# Patient Record
Sex: FEMALE | Race: WHITE | NOT HISPANIC OR LATINO | Employment: FULL TIME | URBAN - METROPOLITAN AREA
[De-identification: names, ages, dates, MRNs, and addresses within clinical notes are randomized per-mention and may not be internally consistent; named-entity substitution may affect disease eponyms.]

---

## 2019-05-23 ENCOUNTER — TRANSFERRED RECORDS (OUTPATIENT)
Dept: MULTI SPECIALTY CLINIC | Facility: CLINIC | Age: 39
End: 2019-05-23
Payer: COMMERCIAL

## 2019-05-23 LAB
ALT SERPL-CCNC: 15 U/L (ref 0–55)
AST SERPL-CCNC: 29 U/L (ref 0–35)
CHOLESTEROL (EXTERNAL): 183 MG/DL (ref 100–200)
CREATININE (EXTERNAL): 0.76 MG/DL (ref 0.6–1.1)
GFR ESTIMATED (EXTERNAL): 86 ML/MIN/1.73M2
GFR ESTIMATED (IF AFRICAN AMERICAN) (EXTERNAL): >90 ML/MIN/1.73M2
GLUCOSE (EXTERNAL): 71 MG/DL (ref 70–100)
HDLC SERPL-MCNC: 65 MG/DL (ref 40–80)
HPV ABSTRACT: NORMAL
LDL CHOLESTEROL (EXTERNAL): 103 MG/DL (ref 0–129)
PAP-ABSTRACT: NORMAL
POTASSIUM (EXTERNAL): 3.8 MEQ/L (ref 3.5–5.3)
TRIGLYCERIDES (EXTERNAL): 75 MG/DL (ref 50–120)
TSH SERPL-ACNC: 1.16 UIU/ML (ref 0.4–5)

## 2020-07-14 ENCOUNTER — VIRTUAL VISIT (OUTPATIENT)
Dept: FAMILY MEDICINE | Facility: CLINIC | Age: 40
End: 2020-07-14
Payer: COMMERCIAL

## 2020-07-14 DIAGNOSIS — F41.9 ANXIETY: ICD-10-CM

## 2020-07-14 DIAGNOSIS — N97.9 FEMALE FERTILITY PROBLEM: Primary | ICD-10-CM

## 2020-07-14 PROCEDURE — 99203 OFFICE O/P NEW LOW 30 MIN: CPT | Mod: 95 | Performed by: FAMILY MEDICINE

## 2020-07-14 RX ORDER — ALBUTEROL SULFATE 90 UG/1
2 AEROSOL, METERED RESPIRATORY (INHALATION) EVERY 6 HOURS
COMMUNITY
End: 2022-01-18

## 2020-07-14 RX ORDER — SUMATRIPTAN 50 MG/1
50 TABLET, FILM COATED ORAL
COMMUNITY
End: 2021-03-03
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
## 2020-08-17 ENCOUNTER — OFFICE VISIT (OUTPATIENT)
Dept: OBGYN | Facility: CLINIC | Age: 40
End: 2020-08-17
Attending: FAMILY MEDICINE
Payer: COMMERCIAL

## 2020-08-17 VITALS
DIASTOLIC BLOOD PRESSURE: 86 MMHG | HEIGHT: 61 IN | SYSTOLIC BLOOD PRESSURE: 127 MMHG | WEIGHT: 133.4 LBS | BODY MASS INDEX: 25.19 KG/M2 | HEART RATE: 75 BPM

## 2020-08-17 DIAGNOSIS — Z31.41 FERTILITY TESTING: Primary | ICD-10-CM

## 2020-08-17 PROCEDURE — G0463 HOSPITAL OUTPT CLINIC VISIT: HCPCS | Mod: ZF

## 2020-08-17 ASSESSMENT — MIFFLIN-ST. JEOR: SCORE: 1222.48

## 2020-08-17 NOTE — LETTER
"2020       RE: Kinsey Delgado  [address]  [address]  [place] [address]     Dear Colleague,    Thank you for referring your patient, Kinsey Delgado, to the [facility] at [facility]. Please see a copy of my visit note below.    39 yo  female with >6 mo history of actively trying to conceive.  She has one prior pregnancy which was uncomplicated. Used NFP in past 3 years w/o pregnancy, but has since tried for >6 months w/o conception.     Menses are regular, occurring every 28 days, lasting 5 days.  [place] has been timed for several months, w/o pain.  Having IC every other day based on OPKs which show ov day 13.     Same partner as prior pregnancy. Semen analysis pending.     History reviewed. No pertinent past medical history.    Past Surgical History:   Procedure Laterality Date     NO HISTORY OF SURGERY       GYN history. No h/o STI.  No h/o appendicitis. No know fibroids or other anomalies. Normal uls <12 mo ago.     Current Outpatient Medications:      albuterol (PROAIR HFA/PROVENTIL HFA/VENTOLIN HFA) 108 (90 Base) MCG/ACT inhaler, Inhale 2 puffs into the lungs every 6 hours, Disp: , Rfl:      SUMAtriptan (IMITREX) 50 MG tablet, Take 50 mg by mouth at onset of headache for migraine, Disp: , Rfl:      Prenatal Vit-Fe Fumarate-FA (PRENATAL VITAMINS PO), , Disp: , Rfl:     # 1 - Date: 17, Sex: Female, Weight: 3.118 kg (6 lb 14 oz), GA: 42w0d, Delivery: , Apgar1: None, Apgar5: None, Living: None, Birth Comments: None    Ht 1.549 m (5' 1\")   Wt 60.5 kg (133 lb 6.4 oz)   LMP 2020 (Exact Date)   Breastfeeding No   BMI 25.21 kg/m     Gen: AA and NAD  ISIDORO 7- 3.  Feels much less stress since moving her where grandparents are available to help w/ .   Non labored breathing  MSK w/o abnormalities    A/p: 39 yo  female here for fertility evaluation.    1.   Orders Placed This Encounter   Procedures     XR " Hysterosalpingogram     Anti-Mullerian hormone     Prolactin     Progesterone     TSH with free T4 reflex     2. [facility] clinic information given to pt as welll     3. F/u semenanalyisis when available    Yulia De Souza MD, FACOG  [facility]  OB/GYN    8/17/2020  4:01 PM

## 2020-08-17 NOTE — PROGRESS NOTES
"39 yo  female with >6 mo history of actively trying to conceive.  She has one prior pregnancy which was uncomplicated. Used NFP in past 3 years w/o pregnancy, but has since tried for >6 months w/o conception.     Menses are regular, occurring every 28 days, lasting 5 days.  [place] has been timed for several months, w/o pain.  Having IC every other day based on OPKs which show ov day 13.     Same partner as prior pregnancy. Semen analysis pending.     History reviewed. No pertinent past medical history.    Past Surgical History:   Procedure Laterality Date     NO HISTORY OF SURGERY       GYN history. No h/o STI.  No h/o appendicitis. No know fibroids or other anomalies. Normal uls <12 mo ago.     Current Outpatient Medications:      albuterol (PROAIR HFA/PROVENTIL HFA/VENTOLIN HFA) 108 (90 Base) MCG/ACT inhaler, Inhale 2 puffs into the lungs every 6 hours, Disp: , Rfl:      SUMAtriptan (IMITREX) 50 MG tablet, Take 50 mg by mouth at onset of headache for migraine, Disp: , Rfl:      Prenatal Vit-Fe Fumarate-FA (PRENATAL VITAMINS PO), , Disp: , Rfl:     # 1 - Date: 17, Sex: Female, Weight: 3.118 kg (6 lb 14 oz), GA: 42w0d, Delivery: , Apgar1: None, Apgar5: None, Living: None, Birth Comments: None    Ht 1.549 m (5' 1\")   Wt 60.5 kg (133 lb 6.4 oz)   LMP 2020 (Exact Date)   Breastfeeding No   BMI 25.21 kg/m     Gen: AA and NAD  ISIDORO 7- 3.  Feels much less stress since moving her where grandparents are available to help w/ .   Non labored breathing  MSK w/o abnormalities    A/p: 39 yo  female here for fertility evaluation.    1.   Orders Placed This Encounter   Procedures     XR Hysterosalpingogram     Anti-Mullerian hormone     Prolactin     Progesterone     TSH with free T4 reflex     2. [facility] information given to pt as welll     3. F/u semenanalyisis when available    Yulia De Souza MD, FACOG  [facility]  OB/GYN    2020  4:01 PM          "

## 2020-08-17 NOTE — NURSING NOTE
Chief Complaint   Patient presents with     Physical     Annual/discuss infertility       See COREY Thomas 8/17/2020

## 2020-08-24 DIAGNOSIS — Z31.41 FERTILITY TESTING: ICD-10-CM

## 2020-08-24 LAB
PROGEST SERPL-MCNC: 8.5 NG/ML
PROLACTIN SERPL-MCNC: 8 UG/L (ref 3–27)
TSH SERPL DL<=0.005 MIU/L-ACNC: 1.11 MU/L (ref 0.4–4)

## 2020-08-24 PROCEDURE — 84443 ASSAY THYROID STIM HORMONE: CPT | Performed by: FAMILY MEDICINE

## 2020-08-24 PROCEDURE — 84144 ASSAY OF PROGESTERONE: CPT | Performed by: FAMILY MEDICINE

## 2020-08-24 PROCEDURE — 99000 SPECIMEN HANDLING OFFICE-LAB: CPT | Performed by: FAMILY MEDICINE

## 2020-08-24 PROCEDURE — 36415 COLL VENOUS BLD VENIPUNCTURE: CPT | Performed by: FAMILY MEDICINE

## 2020-08-24 PROCEDURE — 84146 ASSAY OF PROLACTIN: CPT | Performed by: FAMILY MEDICINE

## 2020-08-24 PROCEDURE — 83520 IMMUNOASSAY QUANT NOS NONAB: CPT | Mod: 90 | Performed by: FAMILY MEDICINE

## 2020-08-26 LAB — MIS SERPL-MCNC: 2.17 NG/ML (ref 0.18–11.71)

## 2020-11-29 ENCOUNTER — HEALTH MAINTENANCE LETTER (OUTPATIENT)
Age: 40
End: 2020-11-29

## 2021-02-23 ENCOUNTER — MYC MEDICAL ADVICE (OUTPATIENT)
Dept: FAMILY MEDICINE | Facility: CLINIC | Age: 41
End: 2021-02-23

## 2021-02-23 NOTE — TELEPHONE ENCOUNTER
AS,  Please see below Alle[facility]ia message and advise.  Do you want virtual visit?  Thanks,  Marianela ORR RN

## 2021-03-03 ENCOUNTER — VIRTUAL VISIT (OUTPATIENT)
Dept: FAMILY MEDICINE | Facility: CLINIC | Age: 41
End: 2021-03-03
Payer: COMMERCIAL

## 2021-03-03 DIAGNOSIS — G43.709 CHRONIC MIGRAINE WITHOUT AURA WITHOUT STATUS MIGRAINOSUS, NOT INTRACTABLE: Primary | ICD-10-CM

## 2021-03-03 PROCEDURE — 99214 OFFICE O/P EST MOD 30 MIN: CPT | Mod: 95 | Performed by: FAMILY MEDICINE

## 2021-03-03 RX ORDER — BUTALBITAL, ACETAMINOPHEN AND CAFFEINE 50; 325; 40 MG/1; MG/1; MG/1
1 TABLET ORAL 2 TIMES DAILY PRN
Qty: 30 TABLET | Refills: 1 | Status: SHIPPED | OUTPATIENT
Start: 2021-03-03 | End: 2021-03-27

## 2021-03-03 RX ORDER — BUTALBITAL/ACETAMINOPHEN 50MG-325MG
1 TABLET ORAL
Qty: 30 TABLET | Refills: 0 | Status: SHIPPED | OUTPATIENT
Start: 2021-03-03 | End: 2021-06-10

## 2021-03-03 NOTE — PROGRESS NOTES
Kinsey is a 40 year old who is being evaluated via a billable video visit.      How would you like to obtain your AVS? MyChart  If the video visit is dropped, the invitation should be resent by:   Will anyone else be joining your video visit? No      Video Start Time: 10:45 AM    Assessment & Plan     Chronic migraine without aura without status migrainosus, not intractable  Assessment.  40-year old , undergoing treatment starting next week.  History of migraine headache  without aura.  Previously Imitrex has helped.  IVF clinic lysed her to avoid using sumatriptan.  We discussed other choices such butalbital pregnancy in detail.    Prevention with adequate hydration and tapering feeling gradually.  - Butalbital-Acetaminophen (PHRENILIN)  MG TABS per tablet  Dispense: 30 tablet; Refill: 0      30 minutes spent on the date of the encounter doing chart review, history and exam, documentation and further activities as noted above      MD KAVON Bains [facility]    Subjective   Kinsey is a 40 year old who presents for the following health issues  HPI   40-year-old female going [facility] is advised  not to use imitrex during IVF periods.  She reports longstanding history of migraine headaches Imitrex always take care of acute headache.  She reports her typical migraine headaches start with the neck pain, wraps around the right side of  Head  becomes throbbing, sensitive to light noise.  It is associated with nausea as well but without aura.  She has had migraine headaches since early 20's .  She states her migraine triggers are change of sleep pattern and around mensturatuon, lack of caffeine.. She is trying to taper caffeine down.    She often gets menstrual migraine a month.  [facility] provider approved butalbital with Tylenol but without caffeine if she has migraine headache during the 10-day stimulation cycle.    She has plans to start IVF week.    Medication Followup of  Sumatriptan    Taking Medication as prescribed: yes    Side Effects:  None    Medication Helping Symptoms:  yes         Review of Systems   Constitutional, HEENT, cardiovascular, pulmonary, GI, , musculoskeletal, neuro, skin, endocrine and psych systems are negative, except as otherwise noted.      Objective           Vitals:  No vitals were obtained today due to virtual visit.    Physical Exam   GENERAL: Healthy, alert and no distress  EYES: Eyes grossly normal to inspection.  No discharge or erythema, or obvious scleral/conjunctival abnormalities.  RESP: No audible wheeze, cough, or visible cyanosis.  No visible retractions or increased work of breathing.    SKIN: Visible skin clear. No significant rash, abnormal pigmentation or lesions.  NEURO: Cranial nerves grossly intact.  Mentation and speech appropriate for age.  PSYCH: Mentation appears normal, affect normal/bright, judgement and insight intact, normal speech and appearance well-groomed.                Video-Visit Details    Type of service:  Video Visit    Video End Time:11:01 AM    Originating Location (pt. Location): Home    Distant Location (provider location):  [facility]     Platform used for Video Visit: Spinl[employer]

## 2021-03-03 NOTE — NURSING NOTE
"Chief Complaint   Patient presents with     Recheck Medication     Headache     initial There were no vitals taken for this visit. Estimated body mass index is 25.21 kg/m  as calculated from the following:    Height as of 8/17/20: 1.549 m (5' 1\").    Weight as of 8/17/20: 60.5 kg (133 lb 6.4 oz).  BP completed using cuff size:   L  R arm      Health Maintenance that is potentially due pending provider review:      Andrew Saini ma  "

## 2021-05-31 LAB — TSH SERPL-ACNC: 1.37 UIU/ML (ref 0.27–4200)

## 2021-08-31 ENCOUNTER — VIRTUAL VISIT (OUTPATIENT)
Dept: FAMILY MEDICINE | Facility: CLINIC | Age: 41
End: 2021-08-31
Payer: COMMERCIAL

## 2021-08-31 DIAGNOSIS — N97.9 FEMALE FERTILITY PROBLEM: Primary | ICD-10-CM

## 2021-08-31 DIAGNOSIS — G43.709 CHRONIC MIGRAINE WITHOUT AURA WITHOUT STATUS MIGRAINOSUS, NOT INTRACTABLE: ICD-10-CM

## 2021-08-31 PROCEDURE — 99213 OFFICE O/P EST LOW 20 MIN: CPT | Mod: 95 | Performed by: FAMILY MEDICINE

## 2021-08-31 RX ORDER — BUTALBITAL/ACETAMINOPHEN 50MG-325MG
1 TABLET ORAL
Qty: 30 TABLET | Refills: 0 | Status: ON HOLD | OUTPATIENT
Start: 2021-08-31 | End: 2022-02-04

## 2021-08-31 NOTE — PROGRESS NOTES
Kinsey is a 41 year old who is being evaluated via a billable video visit.      How would you like to obtain your AVS? MyChart  If the video visit is dropped, the invitation should be resent by: Text to cell phone: [phone]  Will anyone else be joining your video visit? No      Video Start Time: 5:52 PM    Assessment & Plan     Chronic migraine without aura without status migrainosus, not intractable  Comments: 41 year old female with known history  menstrual migraine headache / unable to take preventative medications because going thorugh 2nd cycle of IVF by  Dr Antunez [facility]-([phone]). They have given her clear guidelines not to be on any medications beside one prescribed for phrenilin.  - Butalbital-Acetaminophen (PHRENILIN)  MG TABS per tablet; Take 1 tablet by mouth once as needed for migraine or moderate to severe pain (at onset and repeat in 8 hours prn)    Potential medication side effects were discussed with the patient; let me know if any occur.    27 minutes spent on the date of the encounter doing chart review, history and exam, documentation and further activities per the note      Return in about 3 months (around 11/30/2021) for concerns,unresolved, medications recheck/review/refill.    Chelsey Morales MD  [facility]    Subjective   Kinsey is a 41 year old who presents for the following health issues   HPI     Migraine     Since your last clinic visit, how have your headaches changed?  SAME     How often are you getting headaches or migraines? Monthly      Are you able to do normal daily activities when you have a migraine? With medication migraine is manageable without medication she is out for 3 days     Are you taking rescue/relief medications? (Select all that apply) Other: Phrenilin    How helpful is your rescue/relief medication?  I get some relief    Are you taking any medications to prevent migraines? (Select all that apply)  No    In the past 4 weeks, how  often have you gone to urgent care or the emergency room because of your headaches?  0      How many servings of fruits and vegetables do you eat daily?  Normal amount     On average, how many sweetened beverages do you drink each day (Examples: soda, juice, sweet tea, etc.  Do NOT count diet or artificially sweetened beverages)?   0    How many days per week do you exercise enough to make your heart beat faster? 7    How many minutes a day do you exercise enough to make your heart beat faster? 30 - 60    How many days per week do you miss taking your medication? 0    Each menstrual  migraine episode- needs at least 6-8 tabs   Currently on estrogen pills by IVF - 2nd cycle.  1st cycle - ended in spontaneous loss of pregnancy.   Almost can predict when she would have headache           Review of Systems   Constitutional, HEENT, cardiovascular, pulmonary, GI, , musculoskeletal, neuro, skin, endocrine and psych systems are negative, except as otherwise noted.      Objective           Vitals:  No vitals were obtained today due to virtual visit.    Physical Exam   GENERAL: Healthy, alert and no distress  EYES: Eyes grossly normal to inspection.  No discharge or erythema, or obvious scleral/conjunctival abnormalities.  RESP: No audible wheeze, cough, or visible cyanosis.  No visible retractions or increased work of breathing.    SKIN: Visible skin clear. No significant rash, abnormal pigmentation or lesions.  NEURO: Cranial nerves grossly intact.  Mentation and speech appropriate for age.  PSYCH: Mentation appears normal, affect normal/bright, judgement and insight intact, normal speech and appearance well-groomed.          Video-Visit Details    Type of service:  Video Visit    Video End Time:6:02 PM    Originating Location (pt. Location): Home    Distant Location (provider location):  [facility]     Platform used for Video Visit: [employer] Ozone

## 2021-09-08 ENCOUNTER — TRANSFERRED RECORDS (OUTPATIENT)
Dept: HEALTH INFORMATION MANAGEMENT | Facility: CLINIC | Age: 41
End: 2021-09-08
Payer: COMMERCIAL

## 2021-09-08 LAB
C TRACH DNA SPEC QL PROBE+SIG AMP: NEGATIVE
HIV 1&2 EXT: NORMAL
N GONORRHOEA DNA SPEC QL PROBE+SIG AMP: NEGATIVE
SPECIMEN DESCRIP: NORMAL
SPECIMEN DESCRIPTION: NORMAL

## 2021-09-14 ENCOUNTER — TRANSFERRED RECORDS (OUTPATIENT)
Dept: HEALTH INFORMATION MANAGEMENT | Facility: CLINIC | Age: 41
End: 2021-09-14
Payer: COMMERCIAL

## 2021-09-16 ENCOUNTER — TRANSFERRED RECORDS (OUTPATIENT)
Dept: HEALTH INFORMATION MANAGEMENT | Facility: CLINIC | Age: 41
End: 2021-09-16
Payer: COMMERCIAL

## 2021-09-17 ENCOUNTER — TRANSFERRED RECORDS (OUTPATIENT)
Dept: HEALTH INFORMATION MANAGEMENT | Facility: CLINIC | Age: 41
End: 2021-09-17
Payer: COMMERCIAL

## 2021-09-18 ENCOUNTER — TRANSFERRED RECORDS (OUTPATIENT)
Dept: HEALTH INFORMATION MANAGEMENT | Facility: CLINIC | Age: 41
End: 2021-09-18
Payer: COMMERCIAL

## 2021-09-25 ENCOUNTER — HEALTH MAINTENANCE LETTER (OUTPATIENT)
Age: 41
End: 2021-09-25

## 2021-12-28 ENCOUNTER — TRANSFERRED RECORDS (OUTPATIENT)
Dept: HEALTH INFORMATION MANAGEMENT | Facility: CLINIC | Age: 41
End: 2021-12-28
Payer: COMMERCIAL

## 2022-01-15 ENCOUNTER — HEALTH MAINTENANCE LETTER (OUTPATIENT)
Age: 42
End: 2022-01-15

## 2022-01-17 ASSESSMENT — ANXIETY QUESTIONNAIRES
5. BEING SO RESTLESS THAT IT IS HARD TO SIT STILL: NOT AT ALL
2. NOT BEING ABLE TO STOP OR CONTROL WORRYING: NOT AT ALL
7. FEELING AFRAID AS IF SOMETHING AWFUL MIGHT HAPPEN: NOT AT ALL
7. FEELING AFRAID AS IF SOMETHING AWFUL MIGHT HAPPEN: NOT AT ALL
3. WORRYING TOO MUCH ABOUT DIFFERENT THINGS: NOT AT ALL
GAD7 TOTAL SCORE: 1
1. FEELING NERVOUS, ANXIOUS, OR ON EDGE: NOT AT ALL
6. BECOMING EASILY ANNOYED OR IRRITABLE: SEVERAL DAYS
GAD7 TOTAL SCORE: 1
4. TROUBLE RELAXING: NOT AT ALL

## 2022-01-18 ENCOUNTER — ANCILLARY PROCEDURE (OUTPATIENT)
Dept: ULTRASOUND IMAGING | Facility: CLINIC | Age: 42
End: 2022-01-18
Attending: MIDWIFE
Payer: COMMERCIAL

## 2022-01-18 ENCOUNTER — APPOINTMENT (OUTPATIENT)
Dept: LAB | Facility: CLINIC | Age: 42
End: 2022-01-18
Attending: MIDWIFE
Payer: COMMERCIAL

## 2022-01-18 ENCOUNTER — OFFICE VISIT (OUTPATIENT)
Dept: OBGYN | Facility: CLINIC | Age: 42
End: 2022-01-18
Attending: MIDWIFE
Payer: COMMERCIAL

## 2022-01-18 VITALS
BODY MASS INDEX: 25.02 KG/M2 | HEIGHT: 61 IN | WEIGHT: 132.5 LBS | HEART RATE: 67 BPM | DIASTOLIC BLOOD PRESSURE: 78 MMHG | SYSTOLIC BLOOD PRESSURE: 111 MMHG

## 2022-01-18 DIAGNOSIS — Z32.01 PREGNANCY TEST POSITIVE: ICD-10-CM

## 2022-01-18 DIAGNOSIS — O09.529 SUPERVISION OF HIGH-RISK PREGNANCY OF ELDERLY MULTIGRAVIDA: Primary | ICD-10-CM

## 2022-01-18 DIAGNOSIS — O44.01 PLACENTA PREVIA ANTEPARTUM IN FIRST TRIMESTER: ICD-10-CM

## 2022-01-18 DIAGNOSIS — Z31.41 FERTILITY TESTING: Primary | ICD-10-CM

## 2022-01-18 DIAGNOSIS — O09.811 PREGNANCY RESULTING FROM IN VITRO FERTILIZATION IN FIRST TRIMESTER: ICD-10-CM

## 2022-01-18 PROBLEM — N97.9 FEMALE FERTILITY PROBLEM: Status: RESOLVED | Noted: 2020-07-14 | Resolved: 2022-01-18

## 2022-01-18 PROBLEM — G43.709 CHRONIC MIGRAINE WITHOUT AURA WITHOUT STATUS MIGRAINOSUS, NOT INTRACTABLE: Status: RESOLVED | Noted: 2021-08-31 | Resolved: 2022-01-18

## 2022-01-18 LAB
ABO/RH(D): NORMAL
ANTIBODY SCREEN: NEGATIVE
BASOPHILS # BLD AUTO: 0 10E3/UL (ref 0–0.2)
BASOPHILS NFR BLD AUTO: 0 %
DEPRECATED CALCIDIOL+CALCIFEROL SERPL-MC: 50 UG/L (ref 20–75)
EOSINOPHIL # BLD AUTO: 0.1 10E3/UL (ref 0–0.7)
EOSINOPHIL NFR BLD AUTO: 1 %
ERYTHROCYTE [DISTWIDTH] IN BLOOD BY AUTOMATED COUNT: 12.2 % (ref 10–15)
HBV SURFACE AB SERPL IA-ACNC: 59.27 M[IU]/ML
HBV SURFACE AG SERPL QL IA: NONREACTIVE
HCT VFR BLD AUTO: 39.5 % (ref 35–47)
HCV AB SERPL QL IA: NONREACTIVE
HGB BLD-MCNC: 13 G/DL (ref 11.7–15.7)
HIV 1+2 AB+HIV1 P24 AG SERPL QL IA: NONREACTIVE
IMM GRANULOCYTES # BLD: 0 10E3/UL
IMM GRANULOCYTES NFR BLD: 0 %
LYMPHOCYTES # BLD AUTO: 2 10E3/UL (ref 0.8–5.3)
LYMPHOCYTES NFR BLD AUTO: 25 %
MCH RBC QN AUTO: 30.3 PG (ref 26.5–33)
MCHC RBC AUTO-ENTMCNC: 32.9 G/DL (ref 31.5–36.5)
MCV RBC AUTO: 92 FL (ref 78–100)
MONOCYTES # BLD AUTO: 0.6 10E3/UL (ref 0–1.3)
MONOCYTES NFR BLD AUTO: 7 %
NEUTROPHILS # BLD AUTO: 5.3 10E3/UL (ref 1.6–8.3)
NEUTROPHILS NFR BLD AUTO: 67 %
NRBC # BLD AUTO: 0 10E3/UL
NRBC BLD AUTO-RTO: 0 /100
PLATELET # BLD AUTO: 319 10E3/UL (ref 150–450)
RBC # BLD AUTO: 4.29 10E6/UL (ref 3.8–5.2)
SPECIMEN EXPIRATION DATE: NORMAL
T PALLIDUM AB SER QL: NONREACTIVE
WBC # BLD AUTO: 8 10E3/UL (ref 4–11)

## 2022-01-18 PROCEDURE — 87389 HIV-1 AG W/HIV-1&-2 AB AG IA: CPT | Performed by: MIDWIFE

## 2022-01-18 PROCEDURE — 76801 OB US < 14 WKS SINGLE FETUS: CPT

## 2022-01-18 PROCEDURE — 86780 TREPONEMA PALLIDUM: CPT | Performed by: MIDWIFE

## 2022-01-18 PROCEDURE — 36415 COLL VENOUS BLD VENIPUNCTURE: CPT | Performed by: MIDWIFE

## 2022-01-18 PROCEDURE — 76801 OB US < 14 WKS SINGLE FETUS: CPT | Mod: 26 | Performed by: OBSTETRICS & GYNECOLOGY

## 2022-01-18 PROCEDURE — 86762 RUBELLA ANTIBODY: CPT | Performed by: MIDWIFE

## 2022-01-18 PROCEDURE — G0463 HOSPITAL OUTPT CLINIC VISIT: HCPCS | Mod: 25

## 2022-01-18 PROCEDURE — 86803 HEPATITIS C AB TEST: CPT | Performed by: MIDWIFE

## 2022-01-18 PROCEDURE — 86901 BLOOD TYPING SEROLOGIC RH(D): CPT | Performed by: MIDWIFE

## 2022-01-18 PROCEDURE — 87340 HEPATITIS B SURFACE AG IA: CPT | Performed by: MIDWIFE

## 2022-01-18 PROCEDURE — 99207 PR PRENATAL VISIT: CPT | Performed by: MIDWIFE

## 2022-01-18 PROCEDURE — 85049 AUTOMATED PLATELET COUNT: CPT | Performed by: MIDWIFE

## 2022-01-18 PROCEDURE — 82306 VITAMIN D 25 HYDROXY: CPT | Performed by: MIDWIFE

## 2022-01-18 PROCEDURE — 86706 HEP B SURFACE ANTIBODY: CPT | Performed by: MIDWIFE

## 2022-01-18 RX ORDER — MULTIVITAMIN WITH IRON
1 TABLET ORAL DAILY
COMMUNITY
End: 2023-06-22

## 2022-01-18 RX ORDER — ASPIRIN 81 MG/1
81 TABLET, CHEWABLE ORAL DAILY
Status: ON HOLD | COMMUNITY
End: 2022-02-04

## 2022-01-18 ASSESSMENT — MIFFLIN-ST. JEOR: SCORE: 1213.4

## 2022-01-18 ASSESSMENT — ANXIETY QUESTIONNAIRES: GAD7 TOTAL SCORE: 1

## 2022-01-18 NOTE — H&P (VIEW-ONLY)
[facility] OB Intake note  Subjective   41 year old female presents to clinic for initiation of OB care. No LMP recorded. Patient is pregnant.  at 10w5d by Estimated Date of Delivery: Aug 11, 2022 based on US confirms. Reviewed dating ultrasound. Pregnancy is planned (IVF - [facility]). Has one dose of progesterone left.    Does have migraines - last was over Xmas. Usually associated with beginning of menstrual cycle. Last 3 days. Did not have them before when she was on birth control, nor when she was pregnant the first time. [facility] gave OK for migraine medicine butalbital-acetaminophen. Not taking Imitrex.    No bleeding no cramping, no nausea. Feels very hungry, tired, emotional. Not needing any anti-nausea medications.    Partner name - Justus.        - Genetic/Infection questionnaire completed, risks include none. Pt  does not have a recent known exposure to Parvo or CMV so IgG/IgM testing WILL NOT be ordered.   Recommended Flu Vaccine.  Patient already received Flu Vaccine  - Current Medications  Current Outpatient Medications   Medication Sig Dispense Refill     aspirin (ASA) 81 MG chewable tablet Take 81 mg by mouth daily       Butalbital-Acetaminophen (PHRENILIN)  MG TABS per tablet Take 1 tablet by mouth once as needed for migraine or moderate to severe pain (at onset and repeat in 8 hours prn) 30 tablet 0     Docosahexaenoic Acid (DHA) 200 MG capsule Take 200 mg by mouth daily       magnesium 250 MG tablet Take 1 tablet by mouth daily       Prenatal Vit-Fe Fumarate-FA (PRENATAL VITAMINS PO)            - Co-morbids   Past Medical History:   Diagnosis Date     Female infertility      - Risk for GDM : No known risk factors for GDM so  WILL NOT have an early GCT and Hgb A1C    - High risk factors for Pre E-  No known risk factors of High risk for Pre E       - Moderate risk factor for Pre E Age =35 years or older   Meets one high risk factors or one of the moderate risk facrtors  so WILL consider starting low  dose aspirin (81mg) starting between 12 and 28 weeks to prevent early onset preeclampsia.  ON ASPIRIN    - The patient  does not have a history of spontaneous  birth so  WILL NOT consider progesterone starting at 16-20 weeks and/or serial transvaginal cervical length ultrasounds from 16-24 weeks.     -The patient does not have a history of immunosuppresion or HIV so Toxoplasma IgG/IgM WILL NOT be ordered.    -Assess risk for asymptomatic latent TB (prior infection, recent immigrant from epidemic areas, immunosuppression, living in overcrowded environment):   WILL NOT have PPD skin test or Quantiferon-TB Gold Plus blood draw. *both options valid.    PERSONAL/SOCIAL HISTORY    lives with their family - daughter is 5. Have a cat and dog - both older.    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  Her partner works as a [employer] illustrator.   History of anxiety or depression - hx of both, not since college. Was on selective serotonin reuptake inhibitor before last pregnancy, has not taken since. In therapy. - if Yes, therapy/medication/hospitalization - no.   Additional items: None    Objective  -VS: reviewed and within normal limits   -General appearance: no acute distress, patient is comfortable   NEUROLOGICAL/PSYCHIATRIC   - Orientated x3,   -Mood and affect: normal     Assessment/Plan  Kinsey was seen today for prenatal care.    Diagnoses and all orders for this visit:    Supervision of high-risk pregnancy of elderly multigravida  -     25- OH-Vitamin D  -     ABO/Rh Type and Screen  -     CBC with Platelets Differential  -     Hepatitis B Surface Antigen  -     Hepatitis C antibody  -     Hepatitis B Surface Antibody  -     HIV Antigen Antibody Combo  -     Rubella Antibody IgG  -     Treponema Abs w Reflex to RPR and Titer  -     Urine Culture Aerobic Bacterial  -     [facility] Fetal [facility]tr Referral - Pregnancy; Future    Pregnancy resulting from in vitro  fertilization in first trimester  -     25- OH-Vitamin D  -     ABO/Rh Type and Screen  -     CBC with Platelets Differential  -     Hepatitis B Surface Antigen  -     Hepatitis C antibody  -     Hepatitis B Surface Antibody  -     HIV Antigen Antibody Combo  -     Rubella Antibody IgG  -     Treponema Abs w Reflex to RPR and Titer  -     Urine Culture Aerobic Bacterial  -     Mat Fetal Med Ctr Referral - Pregnancy; Future    Placenta previa antepartum in first trimester  Ultrasound notes placenta previa. Discussed with patient. Counseled on safe practices including no vaginal intercourse, watch for vaginal bleeding.      41 year old  10w5d weeks of pregnancy with AC of Aug 11, 2022 by LMP of No LMP recorded. Patient is pregnant.. Ultrasound confirms.   Outpatient Encounter Medications as of 2022   Medication Sig Dispense Refill     aspirin (ASA) 81 MG chewable tablet Take 81 mg by mouth daily       Butalbital-Acetaminophen (PHRENILIN)  MG TABS per tablet Take 1 tablet by mouth once as needed for migraine or moderate to severe pain (at onset and repeat in 8 hours prn) 30 tablet 0     Docosahexaenoic Acid (DHA) 200 MG capsule Take 200 mg by mouth daily       magnesium 250 MG tablet Take 1 tablet by mouth daily       Prenatal Vit-Fe Fumarate-FA (PRENATAL VITAMINS PO)        [DISCONTINUED] albuterol (PROAIR HFA/PROVENTIL HFA/VENTOLIN HFA) 108 (90 Base) MCG/ACT inhaler Inhale 2 puffs into the lungs every 6 hours (Patient not taking: Reported on 2022)       No facility-administered encounter medications on file as of 2022.      Orders Placed This Encounter   Procedures     25- OH-Vitamin D     Hepatitis B Surface Antigen     Hepatitis C antibody     Hepatitis B Surface Antibody     HIV Antigen Antibody Combo     Rubella Antibody IgG     Treponema Abs w Reflex to RPR and Titer     CBC with platelets and differential     Mat Fetal Med Ctr Referral - Pregnancy     Adult Type and Screen                  Orders Placed This Encounter   Procedures     25- OH-Vitamin D     Hepatitis B Surface Antigen     Hepatitis C antibody     Hepatitis B Surface Antibody     HIV Antigen Antibody Combo     Rubella Antibody IgG     Treponema Abs w Reflex to RPR and Titer     CBC with platelets and differential     Mat Fetal Med Ctr Referral - Pregnancy     Adult Type and Screen     ABO/Rh Type and Screen     CBC with Platelets Differential     - Oriented to Practice, types of care, and how to reach a provider.  Pt prefers Undecided between CNM and MD, will continue to consider.   - Patient received 1st trimester new OB education packet complete with aide of [employer] booklet including information on genetic screening test options.  - Patient declines all genetic screening (had pre-implantation screening).  - Educational handout on the prevention of infections diseases during pregnancy provided.  - Patient was encouraged to start prenatal vitamins as tolerated.    - Patient was sent to lab for routine OB labs including see above).     - Reviewed risk for diabetes in pregnancy - not high risk  - Reviewed recommendation for low dose aspirin daily to prevent pre eclampsia, pt agrees, follow up at NOB visit - already on baby aspirin  - Pregnancy concerns to be addressed by provider at new OB exam include: due for Pap.    Pt to RTO for NOB visit in 4 weeks and prn if questions or concerns.    I, Vivienne Campbell MD, am serving as a scribe; to document services personally performed by  CNM based on data collection and the provider's statements to me.     Vivienne Campbell MD  PGY-1, [facility]      The encounter was performed by me and scribed by the PGY-1 MD . The scribed note accurately reflects my personal services and decisions made by me.     Shannan Beckwith, CHRISTEN CNM      Answers for HPI/ROS submitted by the patient on 1/17/2022  ISIDORO 7 TOTAL SCORE: 1

## 2022-01-18 NOTE — PROGRESS NOTES
[facility] OB Intake note  Subjective   41 year old female presents to clinic for initiation of OB care. No LMP recorded. Patient is pregnant.  at 10w5d by Estimated Date of Delivery: Aug 11, 2022 based on US confirms. Reviewed dating ultrasound. Pregnancy is planned (IVF - [facility]). Has one dose of progesterone left.    Does have migraines - last was over Xmas. Usually associated with beginning of menstrual cycle. Last 3 days. Did not have them before when she was on birth control, nor when she was pregnant the first time. [facility] gave OK for migraine medicine butalbital-acetaminophen. Not taking Imitrex.    No bleeding no cramping, no nausea. Feels very hungry, tired, emotional. Not needing any anti-nausea medications.    Partner name - Justus.        - Genetic/Infection questionnaire completed, risks include none. Pt  does not have a recent known exposure to Parvo or CMV so IgG/IgM testing WILL NOT be ordered.   Recommended Flu Vaccine.  Patient already received Flu Vaccine  - Current Medications  Current Outpatient Medications   Medication Sig Dispense Refill     aspirin (ASA) 81 MG chewable tablet Take 81 mg by mouth daily       Butalbital-Acetaminophen (PHRENILIN)  MG TABS per tablet Take 1 tablet by mouth once as needed for migraine or moderate to severe pain (at onset and repeat in 8 hours prn) 30 tablet 0     Docosahexaenoic Acid (DHA) 200 MG capsule Take 200 mg by mouth daily       magnesium 250 MG tablet Take 1 tablet by mouth daily       Prenatal Vit-Fe Fumarate-FA (PRENATAL VITAMINS PO)            - Co-morbids   Past Medical History:   Diagnosis Date     Female infertility      - Risk for GDM : No known risk factors for GDM so  WILL NOT have an early GCT and Hgb A1C    - High risk factors for Pre E-  No known risk factors of High risk for Pre E       - Moderate risk factor for Pre E Age =35 years or older   Meets one high risk factors or one of the moderate risk facrtors  so WILL consider starting low  dose aspirin (81mg) starting between 12 and 28 weeks to prevent early onset preeclampsia.  ON ASPIRIN    - The patient  does not have a history of spontaneous  birth so  WILL NOT consider progesterone starting at 16-20 weeks and/or serial transvaginal cervical length ultrasounds from 16-24 weeks.     -The patient does not have a history of immunosuppresion or HIV so Toxoplasma IgG/IgM WILL NOT be ordered.    -Assess risk for asymptomatic latent TB (prior infection, recent immigrant from epidemic areas, immunosuppression, living in overcrowded environment):   WILL NOT have PPD skin test or Quantiferon-TB Gold Plus blood draw. *both options valid.    PERSONAL/SOCIAL HISTORY    lives with their family - daughter is 5. Have a cat and dog - both older.    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  Her partner works as a [employer] illustrator.   History of anxiety or depression - hx of both, not since college. Was on selective serotonin reuptake inhibitor before last pregnancy, has not taken since. In therapy. - if Yes, therapy/medication/hospitalization - no.   Additional items: None    Objective  -VS: reviewed and within normal limits   -General appearance: no acute distress, patient is comfortable   NEUROLOGICAL/PSYCHIATRIC   - Orientated x3,   -Mood and affect: normal     Assessment/Plan  Kinsey was seen today for prenatal care.    Diagnoses and all orders for this visit:    Supervision of high-risk pregnancy of elderly multigravida  -     25- OH-Vitamin D  -     ABO/Rh Type and Screen  -     CBC with Platelets Differential  -     Hepatitis B Surface Antigen  -     Hepatitis C antibody  -     Hepatitis B Surface Antibody  -     HIV Antigen Antibody Combo  -     Rubella Antibody IgG  -     Treponema Abs w Reflex to RPR and Titer  -     Urine Culture Aerobic Bacterial  -     Mat Fetal Med Ctr Referral - Pregnancy; Future    Pregnancy resulting from in vitro  fertilization in first trimester  -     25- OH-Vitamin D  -     ABO/Rh Type and Screen  -     CBC with Platelets Differential  -     Hepatitis B Surface Antigen  -     Hepatitis C antibody  -     Hepatitis B Surface Antibody  -     HIV Antigen Antibody Combo  -     Rubella Antibody IgG  -     Treponema Abs w Reflex to RPR and Titer  -     Urine Culture Aerobic Bacterial  -     Mat Fetal Med Ctr Referral - Pregnancy; Future    Placenta previa antepartum in first trimester  Ultrasound notes placenta previa. Discussed with patient. Counseled on safe practices including no vaginal intercourse, watch for vaginal bleeding.      41 year old  10w5d weeks of pregnancy with AC of Aug 11, 2022 by LMP of No LMP recorded. Patient is pregnant.. Ultrasound confirms.   Outpatient Encounter Medications as of 2022   Medication Sig Dispense Refill     aspirin (ASA) 81 MG chewable tablet Take 81 mg by mouth daily       Butalbital-Acetaminophen (PHRENILIN)  MG TABS per tablet Take 1 tablet by mouth once as needed for migraine or moderate to severe pain (at onset and repeat in 8 hours prn) 30 tablet 0     Docosahexaenoic Acid (DHA) 200 MG capsule Take 200 mg by mouth daily       magnesium 250 MG tablet Take 1 tablet by mouth daily       Prenatal Vit-Fe Fumarate-FA (PRENATAL VITAMINS PO)        [DISCONTINUED] albuterol (PROAIR HFA/PROVENTIL HFA/VENTOLIN HFA) 108 (90 Base) MCG/ACT inhaler Inhale 2 puffs into the lungs every 6 hours (Patient not taking: Reported on 2022)       No facility-administered encounter medications on file as of 2022.      Orders Placed This Encounter   Procedures     25- OH-Vitamin D     Hepatitis B Surface Antigen     Hepatitis C antibody     Hepatitis B Surface Antibody     HIV Antigen Antibody Combo     Rubella Antibody IgG     Treponema Abs w Reflex to RPR and Titer     CBC with platelets and differential     Mat Fetal Med Ctr Referral - Pregnancy     Adult Type and Screen                  Orders Placed This Encounter   Procedures     25- OH-Vitamin D     Hepatitis B Surface Antigen     Hepatitis C antibody     Hepatitis B Surface Antibody     HIV Antigen Antibody Combo     Rubella Antibody IgG     Treponema Abs w Reflex to RPR and Titer     CBC with platelets and differential     Mat Fetal Med Ctr Referral - Pregnancy     Adult Type and Screen     ABO/Rh Type and Screen     CBC with Platelets Differential     - Oriented to Practice, types of care, and how to reach a provider.  Pt prefers Undecided between CNM and MD, will continue to consider.   - Patient received 1st trimester new OB education packet complete with aide of [employer] Expect[employer] Family booklet including information on genetic screening test options.  - Patient declines all genetic screening (had pre-implantation screening).  - Educational handout on the prevention of infections diseases during pregnancy provided.  - Patient was encouraged to start prenatal vitamins as tolerated.    - Patient was sent to lab for routine OB labs including see above).     - Reviewed risk for diabetes in pregnancy - not high risk  - Reviewed recommendation for low dose aspirin daily to prevent pre eclampsia, pt agrees, follow up at NOB visit - already on baby aspirin  - Pregnancy concerns to be addressed by provider at new OB exam include: due for Pap.    Pt to RTO for NOB visit in 4 weeks and prn if questions or concerns.    I, Vivienne Campbell MD, am serving as a scribe; to document services personally performed by  CNM based on data collection and the provider's statements to me.     Vivienne Campbell MD  PGY-1, [facility] Family Medicine      The encounter was performed by me and scribed by the PGY-1 MD . The scribed note accurately reflects my personal services and decisions made by me.     Shannan Beckwith, CHRISTEN CNM      Answers for HPI/ROS submitted by the patient on 1/17/2022  ISIDORO 7 TOTAL SCORE: 1

## 2022-01-18 NOTE — LETTER
2022       RE: Kinsey Delgado  [address] [place] [address]     Dear Colleague,    Thank you for referring your patient, Kinsey Delgado, to the [facility] [place] [facility]. Please see a copy of my visit note below.    [facility] OB Intake note  Subjective   41 year old female presents to clinic for initiation of OB care. No LMP recorded. Patient is pregnant.  at 10w5d by Estimated Date of Delivery: Aug 11, 2022 based on US confirms. Reviewed dating ultrasound. Pregnancy is planned (IVF - [facility]). Has one dose of progesterone left.    Does have migraines - last was over Xmas. Usually associated with beginning of menstrual cycle. Last 3 days. Did not have them before when she was on birth control, nor when she was pregnant the first time. [facility] gave OK for migraine medicine butalbital-acetaminophen. Not taking Imitrex.    No bleeding no cramping, no nausea. Feels very hungry, tired, emotional. Not needing any anti-nausea medications.    Partner name - Justus.        - Genetic/Infection questionnaire completed, risks include none. Pt  does not have a recent known exposure to Parvo or CMV so IgG/IgM testing WILL NOT be ordered.   Recommended Flu Vaccine.  Patient already received Flu Vaccine  - Current Medications  Current Outpatient Medications   Medication Sig Dispense Refill     aspirin (ASA) 81 MG chewable tablet Take 81 mg by mouth daily       Butalbital-Acetaminophen (PHRENILIN)  MG TABS per tablet Take 1 tablet by mouth once as needed for migraine or moderate to severe pain (at onset and repeat in 8 hours prn) 30 tablet 0     Docosahexaenoic Acid (DHA) 200 MG capsule Take 200 mg by mouth daily       magnesium 250 MG tablet Take 1 tablet by mouth daily       Prenatal Vit-Fe Fumarate-FA (PRENATAL VITAMINS PO)            - Co-morbids   Past Medical History:   Diagnosis Date     Female infertility      - Risk for GDM : No  known risk factors for GDM so  WILL NOT have an early GCT and Hgb A1C    - High risk factors for Pre E-  No known risk factors of High risk for Pre E       - Moderate risk factor for Pre E Age =35 years or older   Meets one high risk factors or one of the moderate risk facrtors  so WILL consider starting low dose aspirin (81mg) starting between 12 and 28 weeks to prevent early onset preeclampsia.  ON ASPIRIN    - The patient  does not have a history of spontaneous  birth so  WILL NOT consider progesterone starting at 16-20 weeks and/or serial transvaginal cervical length ultrasounds from 16-24 weeks.     -The patient does not have a history of immunosuppresion or HIV so Toxoplasma IgG/IgM WILL NOT be ordered.    -Assess risk for asymptomatic latent TB (prior infection, recent immigrant from epidemic areas, immunosuppression, living in overcrowded environment):   WILL NOT have PPD skin test or Quantiferon-TB Gold Plus blood draw. *both options valid.    PERSONAL/SOCIAL HISTORY    lives with their family - daughter is 5. Have a cat and dog - both older.    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  Her partner works as a [employer].   History of anxiety or depression - hx of both, not since college. Was on selective serotonin reuptake inhibitor before last pregnancy, has not taken since. In therapy. - if Yes, therapy/medication/hospitalization - no.   Additional items: None    Objective  -VS: reviewed and within normal limits   -General appearance: no acute distress, patient is comfortable   NEUROLOGICAL/PSYCHIATRIC   - Orientated x3,   -Mood and affect: normal     Assessment/Plan  Kinsey was seen today for prenatal care.    Diagnoses and all orders for this visit:    Supervision of high-risk pregnancy of elderly multigravida  -     25- OH-Vitamin D  -     ABO/Rh Type and Screen  -     CBC with Platelets Differential  -     Hepatitis B Surface Antigen  -      Hepatitis C antibody  -     Hepatitis B Surface Antibody  -     HIV Antigen Antibody Combo  -     Rubella Antibody IgG  -     Treponema Abs w Reflex to RPR and Titer  -     Urine Culture Aerobic Bacterial  -     Mat Fetal Med Ctr Referral - Pregnancy; Future    Pregnancy resulting from in vitro fertilization in first trimester  -     25- OH-Vitamin D  -     ABO/Rh Type and Screen  -     CBC with Platelets Differential  -     Hepatitis B Surface Antigen  -     Hepatitis C antibody  -     Hepatitis B Surface Antibody  -     HIV Antigen Antibody Combo  -     Rubella Antibody IgG  -     Treponema Abs w Reflex to RPR and Titer  -     Urine Culture Aerobic Bacterial  -     Mat Fetal Med Ctr Referral - Pregnancy; Future    Placenta previa antepartum in first trimester  Ultrasound notes placenta previa. Discussed with patient. Counseled on safe practices including no vaginal intercourse, watch for vaginal bleeding.      41 year old  10w5d weeks of pregnancy with AC of Aug 11, 2022 by LMP of No LMP recorded. Patient is pregnant.. Ultrasound confirms.   Outpatient Encounter Medications as of 2022   Medication Sig Dispense Refill     aspirin (ASA) 81 MG chewable tablet Take 81 mg by mouth daily       Butalbital-Acetaminophen (PHRENILIN)  MG TABS per tablet Take 1 tablet by mouth once as needed for migraine or moderate to severe pain (at onset and repeat in 8 hours prn) 30 tablet 0     Docosahexaenoic Acid (DHA) 200 MG capsule Take 200 mg by mouth daily       magnesium 250 MG tablet Take 1 tablet by mouth daily       Prenatal Vit-Fe Fumarate-FA (PRENATAL VITAMINS PO)        [DISCONTINUED] albuterol (PROAIR HFA/PROVENTIL HFA/VENTOLIN HFA) 108 (90 Base) MCG/ACT inhaler Inhale 2 puffs into the lungs every 6 hours (Patient not taking: Reported on 2022)       No facility-administered encounter medications on file as of 2022.      Orders Placed This Encounter   Procedures     25- OH-Vitamin D      Hepatitis B Surface Antigen     Hepatitis C antibody     Hepatitis B Surface Antibody     HIV Antigen Antibody Combo     Rubella Antibody IgG     Treponema Abs w Reflex to RPR and Titer     CBC with platelets and differential     Mat Fetal Med Ctr Referral - Pregnancy     Adult Type and Screen                 Orders Placed This Encounter   Procedures     25- OH-Vitamin D     Hepatitis B Surface Antigen     Hepatitis C antibody     Hepatitis B Surface Antibody     HIV Antigen Antibody Combo     Rubella Antibody IgG     Treponema Abs w Reflex to RPR and Titer     CBC with platelets and differential     Mat Fetal Med Ctr Referral - Pregnancy     Adult Type and Screen     ABO/Rh Type and Screen     CBC with Platelets Differential     - Oriented to Practice, types of care, and how to reach a provider.  Pt prefers Undecided between CNM and MD, will continue to consider.   - Patient received 1st trimester new OB education packet complete with aide of The Expectant Family booklet including information on genetic screening test options.  - Patient declines all genetic screening (had pre-implantation screening).  - Educational handout on the prevention of infections diseases during pregnancy provided.  - Patient was encouraged to start prenatal vitamins as tolerated.    - Patient was sent to lab for routine OB labs including see above).     - Reviewed risk for diabetes in pregnancy - not high risk  - Reviewed recommendation for low dose aspirin daily to prevent pre eclampsia, pt agrees, follow up at NOB visit - already on baby aspirin  - Pregnancy concerns to be addressed by provider at new OB exam include: due for Pap.    Pt to RTO for NOB visit in 4 weeks and prn if questions or concerns.    I, Vivienne Campbell MD, am serving as a scribe; to document services personally performed by  CNM based on data collection and the provider's statements to me.     Vivienne Campbell MD  PGY-1, [facility]      The  encounter was performed by me and scribed by the PGY-1 MD . The scribed note accurately reflects my personal services and decisions made by me.     CHRISTEN Lin CNM      Answers for HPI/ROS submitted by the patient on 1/17/2022  ISIDORO 7 TOTAL SCORE: 1

## 2022-01-19 LAB
RUBV IGG SERPL QL IA: 4.8 INDEX
RUBV IGG SERPL QL IA: POSITIVE

## 2022-01-23 ENCOUNTER — TRANSCRIBE ORDERS (OUTPATIENT)
Dept: MATERNAL FETAL MEDICINE | Facility: CLINIC | Age: 42
End: 2022-01-23
Payer: COMMERCIAL

## 2022-01-23 DIAGNOSIS — O26.90 PREGNANCY RELATED CONDITION, ANTEPARTUM: Primary | ICD-10-CM

## 2022-01-27 ENCOUNTER — TELEPHONE (OUTPATIENT)
Dept: OBGYN | Facility: CLINIC | Age: 42
End: 2022-01-27

## 2022-01-27 ENCOUNTER — ANCILLARY PROCEDURE (OUTPATIENT)
Dept: ULTRASOUND IMAGING | Facility: CLINIC | Age: 42
End: 2022-01-27
Attending: ADVANCED PRACTICE MIDWIFE
Payer: COMMERCIAL

## 2022-01-27 DIAGNOSIS — O20.0 THREATENED ABORTION: Primary | ICD-10-CM

## 2022-01-27 DIAGNOSIS — O20.0 THREATENED ABORTION: ICD-10-CM

## 2022-01-27 PROCEDURE — 76801 OB US < 14 WKS SINGLE FETUS: CPT | Mod: GC | Performed by: STUDENT IN AN ORGANIZED HEALTH CARE EDUCATION/TRAINING PROGRAM

## 2022-01-27 NOTE — TELEPHONE ENCOUNTER
Kinsey called through call center.  Has been having some light bleeding since last night.  She reports that this is dark, sticky discharge, enough to need a pad.  IVF pregnancy with previa identified on TA US last week.    She denies pain today, though did have cramping discomfort last night.     No recent intercourse or anything in the vagina.    No ultrasound availability in clinic today.  She will be seen at [facility] for US this AM.    Alfredo Hampton notified of plan

## 2022-01-27 NOTE — TELEPHONE ENCOUNTER
Called patient with normal US results.  She will call if increased or prolonged bleeding, increased pain, or new symptoms.

## 2022-02-02 ENCOUNTER — OFFICE VISIT (OUTPATIENT)
Dept: OBGYN | Facility: CLINIC | Age: 42
End: 2022-02-02
Attending: FAMILY MEDICINE
Payer: COMMERCIAL

## 2022-02-02 VITALS
SYSTOLIC BLOOD PRESSURE: 123 MMHG | DIASTOLIC BLOOD PRESSURE: 79 MMHG | WEIGHT: 132 LBS | HEART RATE: 81 BPM | BODY MASS INDEX: 24.92 KG/M2 | HEIGHT: 61 IN

## 2022-02-02 DIAGNOSIS — O02.1 MISSED ABORTION: Primary | ICD-10-CM

## 2022-02-02 PROCEDURE — 76815 OB US LIMITED FETUS(S): CPT | Mod: 26 | Performed by: OBSTETRICS & GYNECOLOGY

## 2022-02-02 PROCEDURE — 99214 OFFICE O/P EST MOD 30 MIN: CPT | Mod: 25 | Performed by: OBSTETRICS & GYNECOLOGY

## 2022-02-02 PROCEDURE — G0463 HOSPITAL OUTPT CLINIC VISIT: HCPCS

## 2022-02-02 RX ORDER — DOXYCYCLINE 100 MG/1
100 CAPSULE ORAL ONCE
Status: CANCELLED | OUTPATIENT
Start: 2022-02-02 | End: 2022-02-02

## 2022-02-02 RX ORDER — ACETAMINOPHEN 325 MG/1
975 TABLET ORAL ONCE
Status: CANCELLED | OUTPATIENT
Start: 2022-02-02 | End: 2022-02-02

## 2022-02-02 ASSESSMENT — PAIN SCALES - GENERAL: PAINLEVEL: NO PAIN (0)

## 2022-02-02 ASSESSMENT — MIFFLIN-ST. JEOR: SCORE: 1211.13

## 2022-02-02 NOTE — LETTER
"2022       RE: Kinsey Delgado  [address] [place] [address]     Dear Colleague,    Thank you for referring your patient, Kinsey Delgado, to the [facility] [place] at [facility]. Please see a copy of my visit note below.    [facility]  Return OB Visit    S: Returning to clinic for vaginal bleeding in pregnancy. First started on 22, when she presented to clinic for ultrasound showing viable pregnancy. Bleeding increased  up to heavy period, when she presented to [facility] for ultrasound that again confirmed viability and also diagnosed subchorionic hemorrhage. Bleeding has decreased since then, now only wearing one pad per day without having to change. Denies abdominal pain or cramping. Denies fevers or chills.       O: /79   Pulse 81   Ht 1.549 m (5' 1\")   Wt 59.9 kg (132 lb)   BMI 24.94 kg/m    Unable to auscultate FHT  BSUS: Missed , no fetal heart rate- confirmed by sonographer      A/P:  Kinsey Delgado is a 41 year old  at 12w6d by IVF dating, diagnosed with missed  in setting of subchorionic hemorrhage and vaginal bleeding. Confirmed via ultrasound today in clinic.     #Missed   - Recommend surgical management due to gestational age. Procedure details discussed.   - D&C orders placed, desires asap  - Had euploid embryo with IVF. Reviewed possible causes of missed  including subchorionic hemorrhage.   - RH positive      Staffed with Dr. Se Hernandez MD PGY3  Obstetrics & Gynecology  22     The Patient was seen in Resident Continuity Clinic by SUBHASH HERNANDEZ.  I reviewed the history & exam. Assessment and plan were jointly made.  I performed the US and participate in counseling regarding management.   Liya Saez MD      "

## 2022-02-02 NOTE — PROGRESS NOTES
"[facility]  Return OB Visit    S: Returning to clinic for vaginal bleeding in pregnancy. First started on 22, when she presented to clinic for ultrasound showing viable pregnancy. Bleeding increased  up to heavy period, when she presented to [facility] for ultrasound that again confirmed viability and also diagnosed subchorionic hemorrhage. Bleeding has decreased since then, now only wearing one pad per day without having to change. Denies abdominal pain or cramping. Denies fevers or chills.       O: /79   Pulse 81   Ht 1.549 m (5' 1\")   Wt 59.9 kg (132 lb)   BMI 24.94 kg/m    Unable to auscultate FHT  BSUS: Missed , no fetal heart rate- confirmed by sonographer      A/P:  Kinsey Delgado is a 41 year old  at 12w6d by IVF dating, diagnosed with missed  in setting of subchorionic hemorrhage and vaginal bleeding. Confirmed via ultrasound today in clinic.     #Missed   - Recommend surgical management due to gestational age. Procedure details discussed.   - D&C orders placed, desires asap  - Had euploid embryo with IVF. Reviewed possible causes of missed  including subchorionic hemorrhage.   - RH positive      Staffed with Dr. Se Hernandez MD PGY3  Obstetrics & Gynecology  22     The Patient was seen in Resident Continuity Clinic by SUBHASH HERNANDEZ.  I reviewed the history & exam. Assessment and plan were jointly made.  I performed the US and participate in counseling regarding management.   Liya Saez MD      "

## 2022-02-03 ENCOUNTER — TELEPHONE (OUTPATIENT)
Dept: OBGYN | Facility: CLINIC | Age: 42
End: 2022-02-03
Payer: COMMERCIAL

## 2022-02-03 ENCOUNTER — LAB (OUTPATIENT)
Dept: LAB | Facility: CLINIC | Age: 42
End: 2022-02-03
Attending: OBSTETRICS & GYNECOLOGY
Payer: COMMERCIAL

## 2022-02-03 DIAGNOSIS — Z11.59 ENCOUNTER FOR SCREENING FOR OTHER VIRAL DISEASES: Primary | ICD-10-CM

## 2022-02-03 DIAGNOSIS — Z11.59 ENCOUNTER FOR SCREENING FOR OTHER VIRAL DISEASES: ICD-10-CM

## 2022-02-03 PROCEDURE — U0005 INFEC AGEN DETEC AMPLI PROBE: HCPCS | Mod: 90

## 2022-02-03 PROCEDURE — U0003 INFECTIOUS AGENT DETECTION BY NUCLEIC ACID (DNA OR RNA); SEVERE ACUTE RESPIRATORY SYNDROME CORONAVIRUS 2 (SARS-COV-2) (CORONAVIRUS DISEASE [COVID-19]), AMPLIFIED PROBE TECHNIQUE, MAKING USE OF HIGH THROUGHPUT TECHNOLOGIES AS DESCRIBED BY CMS-2020-01-R: HCPCS | Mod: 90

## 2022-02-03 PROCEDURE — 99000 SPECIMEN HANDLING OFFICE-LAB: CPT

## 2022-02-03 NOTE — H&P
Pre-op H&P  Name: Kinsey Delgado   : 1980   MRN: [id number]   Proposed procedure: Dilation and curettage using suction   Date of Surgery/ Procedure: 22   Hospital/Surgical Facility:    [facility]      3rd Floor      Fax: [phone]      Phone: [phone]     Kinsey Delgado, 41 year old , presents for pre-operative evaluation and assessment prior to undergoing surgery/procedure for treatment of a missed  .      Preop questions   Primary Physician: [facility]  Type of Anesthesia Anticipated: Local with MAC  History of anesthesia complications: NONE  History of  abnormal bleeding: NONE   History of blood transfusions: NO    Preoperative Questions   1. No - Have you ever had a heart attack or stroke?  2. No - Have you ever had surgery on your heart or blood vessels, such as a stent, coronary (heart) bypass, or surgery on an artery in the head, neck, heart, or legs?  3. No - Do you have chest pain when you are physically active?  4. No - Do you have a history of heart failure?  5. No - Do you currently have a cold, bronchitis, or symptoms of other respiratory (head and chest) infections?  5. No - Do you currently have a cold, bronchitis, or symptoms of other respiratory (head and chest) infections?   6. No - Do you have a cough, shortness of breath, or wheezing?  7. No - Do you or anyone in your family have a history of blood clots?  8. No - Do you or anyone in your family have a serious bleeding problem, such as long-lasting bleeding after surgeries or cuts?  9. No - Have you ever had anemia or been told to take iron pills?  10. No - Have you had any abnormal blood loss such as black, tarry or bloody stools, or abnormal vaginal bleeding?  11. No - Have you ever had a blood transfusion?  12. Yes - Are you willing to have a blood transfusion if it is medically needed before, during, or after your surgery?  13. No - Have you or anyone in your  family ever had problems with anesthesia (sedation for surgery)?  14. No - Do you have sleep apnea, excessive snoring, or daytime drowsiness?   15. No - Do you have any artifical heart valves or other implanted medical devices, such as a pacemaker, defibrillator, or continuous glucose monitor?  16. No - Do you have any artifical joints?  17. No - Are you allergic to latex?    REVIEW OF SYSTEMS:   Constitutional, HEENT, cardiovascular, pulmonary, gi and gu systems are negative, except as otherwise noted.    PMH/PSH/Meds/Allergy/SH/FH     PMH:   Anxiety  Migraines      PSH:   None     Meds: None     Allergies:   Allergies   Allergen Reactions     Seasonal Allergies Other (See Comments)     Itchy eyes, runny nose, sneezing     Latex Allergy: NO  SH:   Social History     Socioeconomic History     Marital status: Single     Spouse name: Not on file     Number of children: Not on file     Years of education: Not on file     Highest education level: Not on file   Occupational History     Not on file   Tobacco Use     Smoking status: Former Smoker     Packs/day: 0.50     Years: 10.00     Pack years: 5.00     Types: Cigarettes     Start date: 2000     Quit date: 2009     Years since quittin.6     Smokeless tobacco: Never Used   Vaping Use     Vaping Use: Never used   Substance and Sexual Activity     Alcohol use: Not Currently     Comment: social      Drug use: Never     Sexual activity: Yes     Partners: Male     Birth control/protection: None     Comment: trying to get pregnant   Other Topics Concern     Parent/sibling w/ CABG, MI or angioplasty before 65F 55M? No   Social History Narrative    Moved from  [place], and 3 yr old      Social Determinants of Health     Financial Resource Strain: Not on file   Food Insecurity: Not on file   Transportation Needs: Not on file   Physical Activity: Not on file   Stress: Not on file   Social Connections: Not on file   Intimate Partner Violence: Not on  "file   Housing Stability: Not on file       EXAM:   /77   Temp 97.9  F (36.6  C) (Oral)   Resp 16   Ht 1.549 m (5' 1\")   Wt 60 kg (132 lb 4.4 oz)   SpO2 100%   BMI 24.99 kg/m    Gen: Resting comfortably, NAD  CV: RRR, no murmurs  Pulm: CTAB, no wheezes  Abd: Soft, appropriately ttp, non-distended  Ext: SCDs in place, trace LE edema b/l       RISK ASSESSMENT:     Cardiovascular Risk:  -Patient is able to participate in strenuous activities without chest pain.  -The patient does not have chest pain with exertion.  -Patient does not have a history of congestive heart failure.    -The patient does not have a history of stroke and does not have a history of valvular disease.    Pulmonary Risk:  -In terms of risk factors for pulmonary complication, the patient has no risk factors    Perioperative Complications:  -The patient does not have a history of bleeding or clotting problems in the past.    -The patient has not had surgery previously.    -The patient does not have a family history of any anesthesia or surgical complications.      IMPRESSION:   Reason for surgery/procedure: Missed      The proposed surgical procedure is considered LOW risk.    For above listed surgery and anesthesia:   Patient is at LOW risk for surgery/procedure and perioperative/procedure complications.    RECOMMENDATIONS:     Proceed to the OR    Rachel Coburn MD   OBGYN resident PGY1  Gyn pager: [phone]  2022 2:14 PM         "

## 2022-02-03 NOTE — TELEPHONE ENCOUNTER
Spoke with patient, informed of surgery date of 2/9/22. Patient was told at appointment yesterday (2/2) by provider that D&C could be scheduled on 2/4. Case request was received after OR schedulers had left for the day. Called to OR this morning to see if it could be scheduled for 2/4, no OR availability until 2/9.    Patient was upset at the wait after being promised a date of 2/4. Had concerns about beginning to bleed, so transferred to triage RN to discuss.    Type of surgery: DILATION AND CURETTAGE, UTERUS, USING SUCTION   Location of surgery: [facility]  Date and time of surgery: 2/9/22 at 8:40am  Surgeon: Tanvi Sandoval MD  Pre-Op Appt Date: DOS  Post-Op Appt Date: NA   Packet sent out: Yes  Pre-cert/Authorization completed:  Yes  Date: 2/3/22    Delisa Almonte  Clinical Services Assistant

## 2022-02-03 NOTE — BRIEF OP NOTE
Gynecologic Brief Operation Note  Name: Kinsey Delgado   MRN: [id number]   : 1980   Procedure date: 2022    Pre-operative diagnosis:   - IUP at 13w1d by IVF dating  - Missed  diagnosed at 12w6d (by LMP) on ultrasound   Post-operative diagnosis: Same, s/p below procedure    Procedure: Suction dilation and curettage  Anesthesia: Monitored anesthesia care + paracervical block    Surgeon: Tanvi Sandoval MD   Assistant(s): Hanane Koo MD PGY4; Kathleen Coburn MD PGY1    Indications: Kinsey Delgado, 41 year old  at 13w1d by IVF dating, was found to have missed  on  by ultrasound. Patient elected surgical management.     Estimated blood loss: 25mL  Total IV fluids: 600 mL, Lactated Ringer  Total urine output: not drained   Specimens: products of conception   Findings:   - Exam under anesthesia: normal appearing external genitalia; uterus anteverted  - Evaluation of pregnancy tissue complete for GA  - Endometrium gritty following procedure, bleeding minimal     Complications: None apparent   Condition: Patient taken to recovery in stable condition.    Rachel Coburn MD   OBGYN resident PGY1  Gyn pager: [phone]  2022 3:53 PM

## 2022-02-03 NOTE — TELEPHONE ENCOUNTER
Pt was called with surgery date of 2/9/21.  Pt feels that she should be seen sooner.  Pt is concerned that she will have increased or continued bleeding.  This writer discussed miscarriage management guidance:    Some Symptoms are: vaginal bleeding, spotting and/or cramping, abdominal pain, lower backache or no symptoms.      Treatment options: Watch and wait, medication, aspiration in the office or aspiration in the OR.    Care after your miscarriage: Call the clinic if:  Bleeding or soaking a pad in an hour for 2 hours.  Have severe cramping that is not relieved by medication for more than 24 hours.  Have a fever higher than 100.4 for more than 4 hours   Have nausea, vomiting, or diarrhea lasting more than 24 hours  Feel overwhelming sadness or depression

## 2022-02-04 ENCOUNTER — ANESTHESIA (OUTPATIENT)
Dept: SURGERY | Facility: CLINIC | Age: 42
End: 2022-02-04
Payer: COMMERCIAL

## 2022-02-04 ENCOUNTER — ANESTHESIA EVENT (OUTPATIENT)
Dept: SURGERY | Facility: CLINIC | Age: 42
End: 2022-02-04
Payer: COMMERCIAL

## 2022-02-04 ENCOUNTER — HOSPITAL ENCOUNTER (OUTPATIENT)
Facility: CLINIC | Age: 42
Discharge: HOME | End: 2022-02-04
Attending: OBSTETRICS & GYNECOLOGY | Admitting: OBSTETRICS & GYNECOLOGY
Payer: COMMERCIAL

## 2022-02-04 VITALS
SYSTOLIC BLOOD PRESSURE: 135 MMHG | HEART RATE: 72 BPM | WEIGHT: 132.28 LBS | DIASTOLIC BLOOD PRESSURE: 78 MMHG | OXYGEN SATURATION: 100 % | HEIGHT: 61 IN | TEMPERATURE: 98.3 F | BODY MASS INDEX: 24.97 KG/M2 | RESPIRATION RATE: 16 BRPM

## 2022-02-04 DIAGNOSIS — O02.1 MISSED ABORTION: ICD-10-CM

## 2022-02-04 DIAGNOSIS — G43.809 OTHER MIGRAINE WITHOUT STATUS MIGRAINOSUS, NOT INTRACTABLE: Primary | ICD-10-CM

## 2022-02-04 LAB
ABO/RH(D): NORMAL
ANTIBODY SCREEN: NEGATIVE
GLUCOSE BLDC GLUCOMTR-MCNC: 76 MG/DL (ref 70–99)
HGB BLD-MCNC: 12.5 G/DL (ref 11.7–15.7)
SARS-COV-2 RNA RESP QL NAA+PROBE: NEGATIVE
SARS-COV-2 RNA RESP QL NAA+PROBE: NORMAL
SARS-COV-2 RNA RESP QL NAA+PROBE: NOT DETECTED
SPECIMEN EXPIRATION DATE: NORMAL

## 2022-02-04 PROCEDURE — 250N000009 HC RX 250: Performed by: OBSTETRICS & GYNECOLOGY

## 2022-02-04 PROCEDURE — 86901 BLOOD TYPING SEROLOGIC RH(D): CPT | Performed by: OBSTETRICS & GYNECOLOGY

## 2022-02-04 PROCEDURE — 82962 GLUCOSE BLOOD TEST: CPT

## 2022-02-04 PROCEDURE — 370N000017 HC ANESTHESIA TECHNICAL FEE, PER MIN: Performed by: OBSTETRICS & GYNECOLOGY

## 2022-02-04 PROCEDURE — 88262 CHROMOSOME ANALYSIS 15-20: CPT | Performed by: OBSTETRICS & GYNECOLOGY

## 2022-02-04 PROCEDURE — 59820 CARE OF MISCARRIAGE: CPT | Mod: GC | Performed by: OBSTETRICS & GYNECOLOGY

## 2022-02-04 PROCEDURE — 76815 OB US LIMITED FETUS(S): CPT | Mod: 26 | Performed by: OBSTETRICS & GYNECOLOGY

## 2022-02-04 PROCEDURE — 258N000003 HC RX IP 258 OP 636: Performed by: NURSE ANESTHETIST, CERTIFIED REGISTERED

## 2022-02-04 PROCEDURE — 36415 COLL VENOUS BLD VENIPUNCTURE: CPT | Performed by: OBSTETRICS & GYNECOLOGY

## 2022-02-04 PROCEDURE — 86850 RBC ANTIBODY SCREEN: CPT | Performed by: OBSTETRICS & GYNECOLOGY

## 2022-02-04 PROCEDURE — 250N000011 HC RX IP 250 OP 636: Performed by: ANESTHESIOLOGY

## 2022-02-04 PROCEDURE — 710N000012 HC RECOVERY PHASE 2, PER MINUTE: Performed by: OBSTETRICS & GYNECOLOGY

## 2022-02-04 PROCEDURE — 85018 HEMOGLOBIN: CPT | Performed by: OBSTETRICS & GYNECOLOGY

## 2022-02-04 PROCEDURE — 999N000141 HC STATISTIC PRE-PROCEDURE NURSING ASSESSMENT: Performed by: OBSTETRICS & GYNECOLOGY

## 2022-02-04 PROCEDURE — 250N000009 HC RX 250: Performed by: NURSE ANESTHETIST, CERTIFIED REGISTERED

## 2022-02-04 PROCEDURE — 88291 CYTO/MOLECULAR REPORT: CPT | Performed by: MEDICAL GENETICS

## 2022-02-04 PROCEDURE — 88305 TISSUE EXAM BY PATHOLOGIST: CPT | Mod: 26 | Performed by: PATHOLOGY

## 2022-02-04 PROCEDURE — 360N000075 HC SURGERY LEVEL 2, PER MIN: Performed by: OBSTETRICS & GYNECOLOGY

## 2022-02-04 PROCEDURE — 88305 TISSUE EXAM BY PATHOLOGIST: CPT | Mod: TC | Performed by: OBSTETRICS & GYNECOLOGY

## 2022-02-04 PROCEDURE — 250N000011 HC RX IP 250 OP 636: Performed by: NURSE ANESTHETIST, CERTIFIED REGISTERED

## 2022-02-04 PROCEDURE — 250N000013 HC RX MED GY IP 250 OP 250 PS 637: Performed by: OBSTETRICS & GYNECOLOGY

## 2022-02-04 PROCEDURE — U0003 INFECTIOUS AGENT DETECTION BY NUCLEIC ACID (DNA OR RNA); SEVERE ACUTE RESPIRATORY SYNDROME CORONAVIRUS 2 (SARS-COV-2) (CORONAVIRUS DISEASE [COVID-19]), AMPLIFIED PROBE TECHNIQUE, MAKING USE OF HIGH THROUGHPUT TECHNOLOGIES AS DESCRIBED BY CMS-2020-01-R: HCPCS | Performed by: ANESTHESIOLOGY

## 2022-02-04 PROCEDURE — 272N000001 HC OR GENERAL SUPPLY STERILE: Performed by: OBSTETRICS & GYNECOLOGY

## 2022-02-04 RX ORDER — PROPOFOL 10 MG/ML
INJECTION, EMULSION INTRAVENOUS CONTINUOUS PRN
Status: DISCONTINUED | OUTPATIENT
Start: 2022-02-04 | End: 2022-02-04

## 2022-02-04 RX ORDER — DEXAMETHASONE SODIUM PHOSPHATE 4 MG/ML
INJECTION, SOLUTION INTRA-ARTICULAR; INTRALESIONAL; INTRAMUSCULAR; INTRAVENOUS; SOFT TISSUE PRN
Status: DISCONTINUED | OUTPATIENT
Start: 2022-02-04 | End: 2022-02-04

## 2022-02-04 RX ORDER — ONDANSETRON 2 MG/ML
4 INJECTION INTRAMUSCULAR; INTRAVENOUS EVERY 30 MIN PRN
Status: DISCONTINUED | OUTPATIENT
Start: 2022-02-04 | End: 2022-02-04

## 2022-02-04 RX ORDER — ACETAMINOPHEN 325 MG/1
975 TABLET ORAL ONCE
Status: COMPLETED | OUTPATIENT
Start: 2022-02-04 | End: 2022-02-04

## 2022-02-04 RX ORDER — FENTANYL CITRATE 50 UG/ML
25 INJECTION, SOLUTION INTRAMUSCULAR; INTRAVENOUS EVERY 5 MIN PRN
Status: DISCONTINUED | OUTPATIENT
Start: 2022-02-04 | End: 2022-02-04 | Stop reason: HOSPADM

## 2022-02-04 RX ORDER — SODIUM CHLORIDE, SODIUM LACTATE, POTASSIUM CHLORIDE, CALCIUM CHLORIDE 600; 310; 30; 20 MG/100ML; MG/100ML; MG/100ML; MG/100ML
INJECTION, SOLUTION INTRAVENOUS CONTINUOUS
Status: DISCONTINUED | OUTPATIENT
Start: 2022-02-04 | End: 2022-02-04

## 2022-02-04 RX ORDER — LIDOCAINE HYDROCHLORIDE 20 MG/ML
INJECTION, SOLUTION INFILTRATION; PERINEURAL PRN
Status: DISCONTINUED | OUTPATIENT
Start: 2022-02-04 | End: 2022-02-04

## 2022-02-04 RX ORDER — ONDANSETRON 2 MG/ML
INJECTION INTRAMUSCULAR; INTRAVENOUS PRN
Status: DISCONTINUED | OUTPATIENT
Start: 2022-02-04 | End: 2022-02-04

## 2022-02-04 RX ORDER — SUMATRIPTAN 50 MG/1
50 TABLET, FILM COATED ORAL
Qty: 30 TABLET | Refills: 0 | Status: SHIPPED | OUTPATIENT
Start: 2022-02-04 | End: 2022-05-03

## 2022-02-04 RX ORDER — ONDANSETRON 4 MG/1
4 TABLET, ORALLY DISINTEGRATING ORAL EVERY 30 MIN PRN
Status: DISCONTINUED | OUTPATIENT
Start: 2022-02-04 | End: 2022-02-04 | Stop reason: HOSPADM

## 2022-02-04 RX ORDER — NALOXONE HYDROCHLORIDE 0.4 MG/ML
0.4 INJECTION, SOLUTION INTRAMUSCULAR; INTRAVENOUS; SUBCUTANEOUS
Status: DISCONTINUED | OUTPATIENT
Start: 2022-02-04 | End: 2022-02-04 | Stop reason: HOSPADM

## 2022-02-04 RX ORDER — ONDANSETRON 2 MG/ML
4 INJECTION INTRAMUSCULAR; INTRAVENOUS EVERY 30 MIN PRN
Status: DISCONTINUED | OUTPATIENT
Start: 2022-02-04 | End: 2022-02-04 | Stop reason: HOSPADM

## 2022-02-04 RX ORDER — FENTANYL CITRATE 50 UG/ML
25 INJECTION, SOLUTION INTRAMUSCULAR; INTRAVENOUS EVERY 5 MIN PRN
Status: DISCONTINUED | OUTPATIENT
Start: 2022-02-04 | End: 2022-02-04

## 2022-02-04 RX ORDER — HYDROMORPHONE HYDROCHLORIDE 1 MG/ML
0.2 INJECTION, SOLUTION INTRAMUSCULAR; INTRAVENOUS; SUBCUTANEOUS EVERY 5 MIN PRN
Status: DISCONTINUED | OUTPATIENT
Start: 2022-02-04 | End: 2022-02-04 | Stop reason: HOSPADM

## 2022-02-04 RX ORDER — FENTANYL CITRATE 50 UG/ML
INJECTION, SOLUTION INTRAMUSCULAR; INTRAVENOUS PRN
Status: DISCONTINUED | OUTPATIENT
Start: 2022-02-04 | End: 2022-02-04

## 2022-02-04 RX ORDER — PROPOFOL 10 MG/ML
INJECTION, EMULSION INTRAVENOUS PRN
Status: DISCONTINUED | OUTPATIENT
Start: 2022-02-04 | End: 2022-02-04

## 2022-02-04 RX ORDER — LIDOCAINE 40 MG/G
CREAM TOPICAL
Status: DISCONTINUED | OUTPATIENT
Start: 2022-02-04 | End: 2022-02-04 | Stop reason: HOSPADM

## 2022-02-04 RX ORDER — DOXYCYCLINE 100 MG/1
100 CAPSULE ORAL ONCE
Status: COMPLETED | OUTPATIENT
Start: 2022-02-04 | End: 2022-02-04

## 2022-02-04 RX ORDER — SUMATRIPTAN 50 MG/1
50 TABLET, FILM COATED ORAL
Qty: 30 TABLET | Refills: 0 | Status: SHIPPED | OUTPATIENT
Start: 2022-02-04 | End: 2022-02-04

## 2022-02-04 RX ORDER — MEPERIDINE HYDROCHLORIDE 25 MG/ML
12.5 INJECTION INTRAMUSCULAR; INTRAVENOUS; SUBCUTANEOUS
Status: DISCONTINUED | OUTPATIENT
Start: 2022-02-04 | End: 2022-02-04 | Stop reason: HOSPADM

## 2022-02-04 RX ORDER — LIDOCAINE HYDROCHLORIDE 10 MG/ML
INJECTION, SOLUTION INFILTRATION; PERINEURAL PRN
Status: DISCONTINUED | OUTPATIENT
Start: 2022-02-04 | End: 2022-02-04 | Stop reason: HOSPADM

## 2022-02-04 RX ORDER — KETOROLAC TROMETHAMINE 30 MG/ML
INJECTION, SOLUTION INTRAMUSCULAR; INTRAVENOUS PRN
Status: DISCONTINUED | OUTPATIENT
Start: 2022-02-04 | End: 2022-02-04

## 2022-02-04 RX ORDER — ONDANSETRON 4 MG/1
4 TABLET, ORALLY DISINTEGRATING ORAL EVERY 30 MIN PRN
Status: DISCONTINUED | OUTPATIENT
Start: 2022-02-04 | End: 2022-02-04

## 2022-02-04 RX ORDER — SODIUM CHLORIDE, SODIUM LACTATE, POTASSIUM CHLORIDE, CALCIUM CHLORIDE 600; 310; 30; 20 MG/100ML; MG/100ML; MG/100ML; MG/100ML
INJECTION, SOLUTION INTRAVENOUS CONTINUOUS PRN
Status: DISCONTINUED | OUTPATIENT
Start: 2022-02-04 | End: 2022-02-04

## 2022-02-04 RX ORDER — OXYCODONE HYDROCHLORIDE 5 MG/1
5 TABLET ORAL EVERY 4 HOURS PRN
Status: DISCONTINUED | OUTPATIENT
Start: 2022-02-04 | End: 2022-02-04

## 2022-02-04 RX ORDER — NALOXONE HYDROCHLORIDE 0.4 MG/ML
0.2 INJECTION, SOLUTION INTRAMUSCULAR; INTRAVENOUS; SUBCUTANEOUS
Status: DISCONTINUED | OUTPATIENT
Start: 2022-02-04 | End: 2022-02-04 | Stop reason: HOSPADM

## 2022-02-04 RX ORDER — SODIUM CHLORIDE, SODIUM LACTATE, POTASSIUM CHLORIDE, CALCIUM CHLORIDE 600; 310; 30; 20 MG/100ML; MG/100ML; MG/100ML; MG/100ML
INJECTION, SOLUTION INTRAVENOUS CONTINUOUS
Status: DISCONTINUED | OUTPATIENT
Start: 2022-02-04 | End: 2022-02-04 | Stop reason: HOSPADM

## 2022-02-04 RX ORDER — OXYCODONE HYDROCHLORIDE 5 MG/1
5 TABLET ORAL EVERY 4 HOURS PRN
Status: DISCONTINUED | OUTPATIENT
Start: 2022-02-04 | End: 2022-02-04 | Stop reason: HOSPADM

## 2022-02-04 RX ORDER — HYDROMORPHONE HYDROCHLORIDE 1 MG/ML
0.2 INJECTION, SOLUTION INTRAMUSCULAR; INTRAVENOUS; SUBCUTANEOUS EVERY 5 MIN PRN
Status: DISCONTINUED | OUTPATIENT
Start: 2022-02-04 | End: 2022-02-04

## 2022-02-04 RX ORDER — FENTANYL CITRATE 50 UG/ML
25 INJECTION, SOLUTION INTRAMUSCULAR; INTRAVENOUS
Status: DISCONTINUED | OUTPATIENT
Start: 2022-02-04 | End: 2022-02-04 | Stop reason: HOSPADM

## 2022-02-04 RX ADMIN — FENTANYL CITRATE 25 MCG: 50 INJECTION, SOLUTION INTRAMUSCULAR; INTRAVENOUS at 15:18

## 2022-02-04 RX ADMIN — KETOROLAC TROMETHAMINE 30 MG: 30 INJECTION, SOLUTION INTRAMUSCULAR at 15:43

## 2022-02-04 RX ADMIN — LIDOCAINE HYDROCHLORIDE 60 MG: 20 INJECTION, SOLUTION INFILTRATION; PERINEURAL at 15:20

## 2022-02-04 RX ADMIN — MIDAZOLAM 2 MG: 1 INJECTION INTRAMUSCULAR; INTRAVENOUS at 15:16

## 2022-02-04 RX ADMIN — ONDANSETRON 4 MG: 2 INJECTION INTRAMUSCULAR; INTRAVENOUS at 15:24

## 2022-02-04 RX ADMIN — SODIUM CHLORIDE, POTASSIUM CHLORIDE, SODIUM LACTATE AND CALCIUM CHLORIDE: 600; 310; 30; 20 INJECTION, SOLUTION INTRAVENOUS at 15:16

## 2022-02-04 RX ADMIN — ACETAMINOPHEN 975 MG: 325 TABLET, FILM COATED ORAL at 14:24

## 2022-02-04 RX ADMIN — PROPOFOL 150 MCG/KG/MIN: 10 INJECTION, EMULSION INTRAVENOUS at 15:20

## 2022-02-04 RX ADMIN — PROPOFOL 50 MG: 10 INJECTION, EMULSION INTRAVENOUS at 15:20

## 2022-02-04 RX ADMIN — FENTANYL CITRATE 25 MCG: 50 INJECTION INTRAMUSCULAR; INTRAVENOUS at 16:05

## 2022-02-04 RX ADMIN — DOXYCYCLINE 100 MG: 100 CAPSULE ORAL at 14:24

## 2022-02-04 RX ADMIN — FENTANYL CITRATE 25 MCG: 50 INJECTION, SOLUTION INTRAMUSCULAR; INTRAVENOUS at 15:33

## 2022-02-04 RX ADMIN — DEXAMETHASONE SODIUM PHOSPHATE 6 MG: 4 INJECTION, SOLUTION INTRAMUSCULAR; INTRAVENOUS at 15:24

## 2022-02-04 ASSESSMENT — LIFESTYLE VARIABLES: TOBACCO_USE: 1

## 2022-02-04 ASSESSMENT — MIFFLIN-ST. JEOR: SCORE: 1212.38

## 2022-02-04 NOTE — ANESTHESIA CARE TRANSFER NOTE
Patient: Kinsey Delgado    Procedure: Procedure(s):  DILATION AND CURETTAGE, UTERUS, USING SUCTION       Diagnosis: Missed  [O02.1]  Diagnosis Additional Information: No value filed.    Anesthesia Type:   MAC     Note:    Oropharynx: oropharynx clear of all foreign objects  Level of Consciousness: awake  Oxygen Supplementation: room air    Independent Airway: airway patency satisfactory and stable  Dentition: dentition unchanged  Vital Signs Stable: post-procedure vital signs reviewed and stable  Report to RN Given: handoff report given  Patient transferred to: Phase II    Handoff Report: Identifed the Patient, Identified the Reponsible Provider, Reviewed the pertinent medical history, Discussed the surgical course, Reviewed Intra-OP anesthesia mangement and issues during anesthesia, Set expectations for post-procedure period and Allowed opportunity for questions and acknowledgement of understanding      Vitals:  Vitals Value Taken Time   /77 22 1553   Temp     Pulse 84 22 1553   Resp     SpO2 98 % 22 1554   Vitals shown include unvalidated device data.    Electronically Signed By: CHRISTEN Jones CRNA  2022  3:55 PM

## 2022-02-04 NOTE — ANESTHESIA PREPROCEDURE EVALUATION
Anesthesia Pre-Procedure Evaluation    Patient: Kinsey Delgado   MRN: [id number] : 1980        Preoperative Diagnosis: Missed  [O02.1]    Procedure : Procedure(s):  DILATION AND CURETTAGE, UTERUS, USING SUCTION          Past Medical History:   Diagnosis Date     Female infertility      Migraine       Past Surgical History:   Procedure Laterality Date     NO HISTORY OF SURGERY        Allergies   Allergen Reactions     Seasonal Allergies Other (See Comments)     Itchy eyes, runny nose, sneezing      Social History     Tobacco Use     Smoking status: Former Smoker     Packs/day: 0.50     Years: 10.00     Pack years: 5.00     Types: Cigarettes     Start date: 2000     Quit date: 2009     Years since quittin.6     Smokeless tobacco: Never Used   Substance Use Topics     Alcohol use: Not Currently     Comment: social       Wt Readings from Last 1 Encounters:   22 60 kg (132 lb 4.4 oz)        Anesthesia Evaluation            ROS/MED HX  ENT/Pulmonary:     (+) tobacco use, Past use,     Neurologic:  - neg neurologic ROS     Cardiovascular:  - neg cardiovascular ROS     METS/Exercise Tolerance:     Hematologic:  - neg hematologic  ROS     Musculoskeletal:  - neg musculoskeletal ROS     GI/Hepatic:  - neg GI/hepatic ROS     Renal/Genitourinary: Comment: Missed AB      Endo:  - neg endo ROS     Psychiatric/Substance Use:     (+) psychiatric history anxiety and depression     Infectious Disease:  - neg infectious disease ROS     Malignancy:  - neg malignancy ROS     Other:  - neg other ROS          Physical Exam    Airway  airway exam normal      Mallampati: I   TM distance: > 3 FB   Neck ROM: full   Mouth opening: > 3 cm    Respiratory Devices and Support         Dental  no notable dental history         Cardiovascular   cardiovascular exam normal       Rhythm and rate: regular and normal     Pulmonary   pulmonary exam normal                OUTSIDE LABS:  CBC:   Lab Results   Component Value  Date    WBC 8.0 01/18/2022    HGB 12.5 02/04/2022    HGB 13.0 01/18/2022    HCT 39.5 01/18/2022     01/18/2022     BMP:   Lab Results   Component Value Date     07/21/2006    POTASSIUM 3.7 07/21/2006    CHLORIDE 106 07/21/2006    CO2 25 07/21/2006    BUN 15 07/21/2006    CR 0.88 07/21/2006    GLC 76 02/04/2022    GLC 72 07/21/2006     COAGS: No results found for: PTT, INR, FIBR  POC: No results found for: BGM, HCG, HCGS  HEPATIC: No results found for: ALBUMIN, PROTTOTAL, ALT, AST, GGT, ALKPHOS, BILITOTAL, BILIDIRECT, ROSA  OTHER:   Lab Results   Component Value Date    CINTHYA 9.3 07/21/2006    TSH 1.11 08/24/2020       Anesthesia Plan    ASA Status:  2   NPO Status:  NPO Appropriate    Anesthesia Type: MAC.     - Reason for MAC: straight local not clinically adequate, immobility needed      Maintenance: TIVA.        Consents    Anesthesia Plan(s) and associated risks, benefits, and realistic alternatives discussed. Questions answered and patient/representative(s) expressed understanding.    - Discussed:     - Discussed with:  Patient      - Extended Intubation/Ventilatory Support Discussed: No.      - Patient is DNR/DNI Status: No    Use of blood products discussed: Yes.     - Discussed with: Patient.     Postoperative Care    Pain management: IV analgesics, Oral pain medications.   PONV prophylaxis: Ondansetron (or other 5HT-3), Background Propofol Infusion     Comments:    Other Comments: MAC, sedation, GA as back up, standard ASA monitors  All pertinent results and records reviewed, risks, included but not limited to hypoventilation, hypoxemia, laryngo/bronchospasm, N/V, intraoperative awareness due to sedation only d/w patient, all questions, concerns addressed            Jeffery Sun MD

## 2022-02-04 NOTE — OP NOTE
Gynecologic Full Operation Note  Name: Kinsey Delgado   MRN: [id number]   : 1980   Procedure date: 2022     Pre-operative diagnosis:   - IUP at 13w1d by IVF dating  - Missed  diagnosed at 12w6d (by LMP) on ultrasound 22  Post-operative diagnosis: Same, s/p below procedure     Procedure: Suction dilation and curettage  Anesthesia: Monitored anesthesia care + paracervical block     Surgeon: Tanvi Sandoval MD  Assistant(s): Kathleen Coburn MD PGY1     Indications: Kinsey Delgado, 41 year old  at 13w1d by IVF dating, was found to have missed  on 22 by US. Patient elected surgical management.      Estimated blood loss: 25mL  Total IV fluids: 600 mL, Lactated Ringer  Total urine output: not evaluated   Specimens: Products of conception sent for cytogenetics   Findings:   - Exam under anesthesia: normal appearing external genitalia; uterus anteverted,   - Products of conception complete for gestational age    - Minimal bleeding from the cervical os following the procedure, tenaculum sites hemostatic      Procedure:  Patient was taken to the OR where she underwent MAC anesthesia without difficulty. The patient was positioned in the dorsal lithotomy position using yellow-fin stirrups.  She was then prepped and draped in the normal sterile fashion.     A sterile speculum was placed in the vagina and the cervix was visualized. A single toothed tenaculum was applied. The 4 and 8 o'clock positions of the cervical vaginal junction were then injected with a total of 20mL of  1% lidocaine on each side. The cervix was serially dilated with Anne dilators to 35 Romanian.  A size 11 mm firm, curved suction tip was then placed into the cervical canal without difficulty.  Multiple passes were made with good return of tissue noted. A final pass was made and all aspects of the endometrium were noted to be gritty. The suction currette was removed.     The tenaculum was removed and sites noted  to be hemostatic after application of silver nitrate. The speculum was then removed. The patient tolerated the procedure well and was taken to the PACU for recovery in stable condition. Instrument, needle and sponge counts correct x2.   Dr. Sandoval was scrubbed and present for the entire procedure.    Rachel Coburn MD   OBGYN resident PGY1  Gyn pager: [phone]  02/04/22 4:08 PM

## 2022-02-04 NOTE — ANESTHESIA POSTPROCEDURE EVALUATION
Patient: Kinsey Delgado    Procedure: Procedure(s):  DILATION AND CURETTAGE, UTERUS, USING SUCTION       Diagnosis:Missed  [O02.1]  Diagnosis Additional Information: No value filed.    Anesthesia Type:  MAC    Note:     Postop Pain Control: Uneventful            Sign Out: Well controlled pain   PONV: No   Neuro/Psych: Uneventful            Sign Out: Acceptable/Baseline neuro status   Airway/Respiratory: Uneventful            Sign Out: Acceptable/Baseline resp. status   CV/Hemodynamics: Uneventful            Sign Out: Acceptable CV status; No obvious hypovolemia; No obvious fluid overload   Other NRE: NONE   DID A NON-ROUTINE EVENT OCCUR? No           Last vitals:  Vitals Value Taken Time   /83 22 1600   Temp 36.6  C (97.8  F) 22 1553   Pulse 75 22 1600   Resp     SpO2 99 % 22 1619   Vitals shown include unvalidated device data.    Electronically Signed By: Robert Ford DO  2022  4:21 PM

## 2022-02-04 NOTE — DISCHARGE INSTRUCTIONS
Discharge Instructions: Following a Dilation   and Curettage/Dilation and Evacuation    What to expect:    Expect small to moderate amount of vaginal bleeding which should taper off in 4-5 days. It should not be heavier than your regular menstrual flow.    Do not douche, and use a pad rather than tampons.     No intercourse until bleeding has ceased.    Activity:    Rest the day of surgery. You may resume normal activity the next day.    You may bathe or shower.    Avoid heavy lifting (10-15 lbs) for one week.    Comfort:    The amount of discomfort you can expect is very unpredictable. If you have pain that cannot be controlled with non-aspirin pain relievers or with the prescription you may have received, you should notify your doctor.    Abdominal cramping (like menstrual cramps) or low back ache are common and should not be a cause for concern. You will be drowsy and weak the day of surgery and possibly the following day.    Diet:    You have no restrictions on your diet. Following surgery, drink plenty of fluids and eat a light meal.    Nausea:    The anesthesia medications you received during your surgical procedure may produce some nausea.    If you feel nauseated, stay in bed, keep your head down and try drinking fluids such as Seven-Up, tea or soup.    Notify Physician at once if you experience:    A fever over 100.4 degrees (a low grade fever under 100 degrees is usual after surgery).    Heavy flow and/or passing large clots. Saturating more than 1 pad per hour for 2 or more hours.     Severe pain or cramps.    Important numbers  [facility] ([address]) - [place]: [phone]   [facility] ([address]) : [phone]  Rev. 5/12    975 mg Tylenol given at 2:25pm. Next dose can be taken after 10:30pm.  30 mg Toradol (similar to Ibuprofen/advil) given at 3:45. Next dose can be taken after 9:45pm    Same-Day Surgery   Adult Discharge Orders & Instructions      For 24 hours after surgery:  1. Get plenty of rest.  A responsible adult must stay with you for at least 24 hours after you leave the hospital.   2. Pain medication can slow your reflexes. Do not drive or use heavy equipment.  If you have weakness or tingling, don't drive or use heavy equipment until this feeling goes away.  3. Mixing alcohol and pain medication can cause dizziness and slow your breathing. It can even be fatal. Do not drink alcohol while taking pain medication.  4. Avoid strenuous or risky activities.  Ask for help when climbing stairs.   5. You may feel lightheaded.  If so, sit for a few minutes before standing.  Have someone help you get up.   6. If you have nausea (feel sick to your stomach), drink only clear liquids such as apple juice, ginger ale, broth or 7-Up.  Rest may also help.  Be sure to drink enough fluids.  Move to a regular diet as you feel able. Take pain medications with a small amount of solid food, such as toast or crackers, to avoid nausea.   7. A slight fever is normal. Call the doctor if your fever is over 100 F (37.7 C) (taken under the tongue) or lasts longer than 24 hours.  8. You may have a dry mouth, muscle aches, trouble sleeping or a sore throat.  These symptoms should go away after 24 hours.  9. Do not make important or legal decisions.   Pain Management:      1. Take pain medication (if prescribed) for pain as directed by your physician.        2. WARNING: If the pain medication you have been prescribed contains Tylenol  (acetaminophen), DO NOT take additional doses of Tylenol (acetaminophen).     Call your doctor for any of the followin.  Signs of infection (fever, growing tenderness at the surgery site, severe pain, a large amount of drainage or bleeding, foul-smelling drainage, redness, swelling).    2.  It has been over 8 to 10 hours since surgery and you are still not able to urinate (pee).    3.  Headache for over 24 hours.    4.  Numbness, tingling or  weakness the day after surgery (if you had spinal anesthesia).  To contact a doctor, call Dr. Sandoval, [facility] at [phone]   or:      [phone] and ask for the Resident On Call for:          OBGYN (answered 24 hours a day)      Emergency Department:  [facility]: [phone]  [facility]: [phone]               Rev. 10/2014

## 2022-02-08 ENCOUNTER — TELEPHONE (OUTPATIENT)
Dept: OBGYN | Facility: CLINIC | Age: 42
End: 2022-02-08
Payer: COMMERCIAL

## 2022-02-08 NOTE — TELEPHONE ENCOUNTER
Called Kinsey.  She has already spoken to one of the physicians and feels that her concerns were adequately addressed.  Reviewed ED precautions and provided after hours number in case she has concerns later tonight

## 2022-02-08 NOTE — TELEPHONE ENCOUNTER
----- Message from Jovanna Simpson RN sent at 2/8/2022  4:26 PM CST -----  Regarding: increased bleeding  Patient reports she had a D&C on Friday and began experiencing increased bleeding today. Requesting call back.

## 2022-02-15 LAB
PATH REPORT.COMMENTS IMP SPEC: NORMAL
PATH REPORT.COMMENTS IMP SPEC: NORMAL
PATH REPORT.FINAL DX SPEC: NORMAL
PATH REPORT.GROSS SPEC: NORMAL
PATH REPORT.MICROSCOPIC SPEC OTHER STN: NORMAL
PATH REPORT.RELEVANT HX SPEC: NORMAL
PHOTO IMAGE: NORMAL

## 2022-03-09 LAB
ADDITIONAL COMMENTS: NORMAL
INTERPRETATION: NORMAL
ISCN: NORMAL
METHODS: NORMAL

## 2022-05-02 ENCOUNTER — MYC MEDICAL ADVICE (OUTPATIENT)
Dept: FAMILY MEDICINE | Facility: CLINIC | Age: 42
End: 2022-05-02
Payer: COMMERCIAL

## 2022-05-02 DIAGNOSIS — G43.809 OTHER MIGRAINE WITHOUT STATUS MIGRAINOSUS, NOT INTRACTABLE: ICD-10-CM

## 2022-05-03 RX ORDER — SUMATRIPTAN 50 MG/1
50 TABLET, FILM COATED ORAL
Qty: 30 TABLET | Refills: 0 | Status: SHIPPED | OUTPATIENT
Start: 2022-05-03 | End: 2022-05-24

## 2022-05-03 NOTE — TELEPHONE ENCOUNTER
AS,  Please see below [employer] message and advise.  Order pended if you approve  Thanks,  Marianela ORR RN

## 2022-05-23 NOTE — PROGRESS NOTES
SUBJECTIVE:   CC: Kinsey Delgado is an 41 year old woman who presents for preventive health visit.     Patient has been advised of split billing requirements and indicates understanding: Yes  Healthy Habits:     Getting at least 3 servings of Calcium per day:  NO    Bi-annual eye exam:  NO    Dental care twice a year:  Yes    Sleep apnea or symptoms of sleep apnea:  None    Diet:  Regular (no restrictions)    Frequency of exercise:  2-3 days/week    Duration of exercise:  15-30 minutes    Taking medications regularly:  No    Barriers to taking medications:  None    Medication side effects:  None    PHQ-2 Total Score: 0    Additional concerns today:  No    She has a medical history significant for migraine headaches that occur monthly with her menstrual cycle.  She takes Imitrex as needed.  She reports that this dulls the headaches, but they are still quite significant and last approximately 3 days every month.  She also has a history of fertility difficulty.  She reports that her  sperm count is very low.  She has gone through IVF twice unsuccessfully.  She reports that there is one egg left and they are planning to try 1 more time.  She and her  have a 5-year-old daughter who was conceived naturally.    Social history: , 5-year-old daughter who is deaf and has multiple food allergies.  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              Today's PHQ-2 Score:   PHQ-2 ( 1999 Pf[employer]) 5/24/2022   Q1: Little interest or pleasure in doing things 0   Q2: Feeling down, depressed or hopeless 0   PHQ-2 Score 0   PHQ-2 Total Score (12-17 Years)- Positive if 3 or more points; Administer PHQ-A if positive -   Q1: Little interest or pleasure in doing things Not at all   Q2: Feeling down, depressed or hopeless Not at all   PHQ-2 Score 0       Abuse: Current or Past (Physical, Sexual or Emotional) - No  Do you feel safe in your environment? Yes    Have you ever done Advance  Care Planning? (For example, a Health Directive, POLST, or a discussion with a medical provider or your loved ones about your wishes): No, advance care planning information given to patient to review.  Patient declined advance care planning discussion at this time.    Social History     Tobacco Use     Smoking status: Former Smoker     Packs/day: 0.50     Years: 10.00     Pack years: 5.00     Types: Cigarettes     Start date: 2000     Quit date: 2009     Years since quittin.9     Smokeless tobacco: Never Used   Substance Use Topics     Alcohol use: Not Currently     Comment: social      If you drink alcohol do you typically have >3 drinks per day or >7 drinks per week? Yes    Alcohol Use 2022   Prescreen: >3 drinks/day or >7 drinks/week? Yes   Prescreen: >3 drinks/day or >7 drinks/week? -     AUDIT - Alcohol Use Disorders Identification Test - Reproduced from the World Health Organization Audit 2001 (Second Edition) 2022   1.  How often do you have a drink containing alcohol? 4 or more times a week   2.  How many drinks containing alcohol do you have on a typical day when you are drinking? 1 or 2   3.  How often do you have five or more drinks on one occasion? Never   9.  Have you or someone else been injured because of your drinking? No   10. Has a relative, friend, doctor or other health care worker been concerned about your drinking or suggested you cut down? No       Reviewed orders with patient.  Reviewed health maintenance and updated orders accordingly - Yes  Lab work is in process  Labs reviewed in EPIC    Breast Cancer Screening:  Any new diagnosis of family breast, ovarian, or bowel cancer? No    FHS-7:   Breast CA Risk Assessment (FHS-7) 2022   Did any of your first-degree relatives have breast or ovarian cancer? No   Did any of your relatives have bilateral breast cancer? No   Did any man in your family have breast cancer? No   Did any woman in your family have breast and  "ovarian cancer? No   Did any woman in your family have breast cancer before age 50 y? No   Do you have 2 or more relatives with breast and/or ovarian cancer? No   Do you have 2 or more relatives with breast and/or bowel cancer? No         Pertinent mammograms are reviewed under the imaging tab.         Reviewed and updated as needed this visit by clinical staff   Tobacco  Allergies  Meds   Med Hx  Surg Hx  Fam Hx  Soc Hx          Reviewed and updated as needed this visit by Provider     Meds                   Review of Systems   Constitutional: Negative for chills and fever.   HENT: Negative for congestion, ear pain, hearing loss and sore throat.    Eyes: Negative for pain and visual disturbance.   Respiratory: Negative for cough and shortness of breath.    Cardiovascular: Negative for chest pain, palpitations and peripheral edema.   Gastrointestinal: Negative for abdominal pain, constipation, diarrhea, heartburn, hematochezia and nausea.   Genitourinary: Negative for dysuria, frequency, genital sores, hematuria and urgency.   Musculoskeletal: Positive for myalgias. Negative for arthralgias and joint swelling.   Skin: Negative for rash.   Neurological: Positive for headaches. Negative for dizziness, weakness and paresthesias.   Psychiatric/Behavioral: Negative for mood changes. The patient is not nervous/anxious.           OBJECTIVE:   /84   Pulse 66   Temp 96.8  F (36  C)   Resp 18   Ht 1.543 m (5' 0.75\")   Wt 62.1 kg (136 lb 14.4 oz)   LMP 05/12/2022   SpO2 100%   Breastfeeding Unknown   BMI 26.08 kg/m    Physical Exam  GENERAL: healthy, alert and no distress  EYES: Eyes grossly normal to inspection, PERRL and conjunctivae and sclerae normal  HENT: ear canals and TM's normal, nose and mouth without ulcers or lesions  NECK: no adenopathy, no asymmetry, masses, or scars and thyroid normal to palpation  RESP: lungs clear to auscultation - no rales, rhonchi or wheezes  CV: regular rate and " "rhythm, normal S1 S2, no S3 or S4, no murmur, click or rub, no peripheral edema and peripheral pulses strong  ABDOMEN: soft, nontender, no hepatosplenomegaly, no masses and bowel sounds normal  MS: no gross musculoskeletal defects noted, no edema  SKIN: no suspicious lesions or rashes  NEURO: Normal strength and tone, mentation intact and speech normal  PSYCH: mentation appears normal, affect normal/bright    Diagnostic Test Results:  Labs reviewed in Epic    ASSESSMENT/PLAN:   1. Routine general medical examination at a health care facility  I recommended she continue getting regular exercise and eat a healthy diet.  She reports already having a mammogram and she had a normal cholesterol panel in 2019 as well as a normal Pap smear with HPV testing in 2019.  On 2/4/2022 she had a normal hemoglobin and glucose.    2. Other migraine without status migrainosus, not intractable  She was given a refill of sumatriptan to be used as needed for menstrual migraines.  She is also given a referral to neurology which she is interested in pursuing to talk about options for daily prophylaxis after she finishes her next IVF treatment.  - SUMAtriptan (IMITREX) 50 MG tablet; Take 1 tablet (50 mg) by mouth at onset of headache for migraine May repeat in 2 hours. Max 4 tablets/24 hours.  Dispense: 30 tablet; Refill: 3  - Adult Neurology  Referral; Future    3. Female fertility problem  She has gone through 2 cycles of IVF which have been unsuccessful.  She is planning to try 1 more time.      Patient has been advised of split billing requirements and indicates understanding: Yes    COUNSELING:  Reviewed preventive health counseling, as reflected in patient instructions       Regular exercise       Healthy diet/nutrition    Estimated body mass index is 26.08 kg/m  as calculated from the following:    Height as of this encounter: 1.543 m (5' 0.75\").    Weight as of this encounter: 62.1 kg (136 lb 14.4 oz).    Weight management " plan: Discussed healthy diet and exercise guidelines    She reports that she quit smoking about 12 years ago. Her smoking use included cigarettes. She started smoking about 21 years ago. She has a 5.00 pack-year smoking history. She has never used smokeless tobacco.      Counseling Resources:  ATP IV Guidelines  Pooled Cohorts Equation Calculator  Breast Cancer Risk Calculator  BRCA-Related Cancer Risk Assessment: FHS-7 Tool  FRAX Risk Assessment  ICSI Preventive Guidelines  Dietary Guidelines for Americans, 2010  USDA's MyPlate  ASA Prophylaxis  Lung CA Screening    Antonino Venegas DO  [facility]

## 2022-05-24 ENCOUNTER — OFFICE VISIT (OUTPATIENT)
Dept: FAMILY MEDICINE | Facility: CLINIC | Age: 42
End: 2022-05-24
Payer: COMMERCIAL

## 2022-05-24 VITALS
SYSTOLIC BLOOD PRESSURE: 128 MMHG | RESPIRATION RATE: 18 BRPM | HEIGHT: 61 IN | WEIGHT: 136.9 LBS | BODY MASS INDEX: 25.85 KG/M2 | HEART RATE: 66 BPM | DIASTOLIC BLOOD PRESSURE: 84 MMHG | TEMPERATURE: 96.8 F | OXYGEN SATURATION: 100 %

## 2022-05-24 DIAGNOSIS — G43.809 OTHER MIGRAINE WITHOUT STATUS MIGRAINOSUS, NOT INTRACTABLE: ICD-10-CM

## 2022-05-24 DIAGNOSIS — Z00.00 ROUTINE GENERAL MEDICAL EXAMINATION AT A HEALTH CARE FACILITY: Primary | ICD-10-CM

## 2022-05-24 DIAGNOSIS — N97.9 FEMALE FERTILITY PROBLEM: ICD-10-CM

## 2022-05-24 PROCEDURE — 99395 PREV VISIT EST AGE 18-39: CPT | Performed by: FAMILY MEDICINE

## 2022-05-24 RX ORDER — SUMATRIPTAN 50 MG/1
50 TABLET, FILM COATED ORAL
Qty: 30 TABLET | Refills: 3 | Status: SHIPPED | OUTPATIENT
Start: 2022-05-24 | End: 2022-08-24

## 2022-05-24 RX ORDER — FAMOTIDINE 20 MG
TABLET ORAL
COMMUNITY
End: 2023-07-14

## 2022-05-24 ASSESSMENT — ENCOUNTER SYMPTOMS
HEMATOCHEZIA: 0
HEADACHES: 1
FEVER: 0
SORE THROAT: 0
WEAKNESS: 0
DIZZINESS: 0
ARTHRALGIAS: 0
PALPITATIONS: 0
DIARRHEA: 0
DYSURIA: 0
CONSTIPATION: 0
MYALGIAS: 1
PARESTHESIAS: 0
SHORTNESS OF BREATH: 0
HEARTBURN: 0
NAUSEA: 0
HEMATURIA: 0
EYE PAIN: 0
COUGH: 0
FREQUENCY: 0
NERVOUS/ANXIOUS: 0
CHILLS: 0
ABDOMINAL PAIN: 0
JOINT SWELLING: 0

## 2022-05-24 NOTE — PROGRESS NOTES
Please abstract the following data from this visit with this patient into the appropriate field in Epic:    Tests that can be patient reported without a hard copy:    Pap smear done on this date: 5/23/2019 (approximately), by this group: [facility], results were Negative.     Marianela ORR RN

## 2022-08-24 ENCOUNTER — VIRTUAL VISIT (OUTPATIENT)
Dept: NEUROLOGY | Facility: CLINIC | Age: 42
End: 2022-08-24
Attending: FAMILY MEDICINE
Payer: COMMERCIAL

## 2022-08-24 ENCOUNTER — PRE VISIT (OUTPATIENT)
Dept: NEUROLOGY | Facility: CLINIC | Age: 42
End: 2022-08-24

## 2022-08-24 ENCOUNTER — TELEPHONE (OUTPATIENT)
Dept: NEUROLOGY | Facility: CLINIC | Age: 42
End: 2022-08-24

## 2022-08-24 DIAGNOSIS — G43.009 MIGRAINE WITHOUT AURA AND WITHOUT STATUS MIGRAINOSUS, NOT INTRACTABLE: Primary | ICD-10-CM

## 2022-08-24 PROCEDURE — 99204 OFFICE O/P NEW MOD 45 MIN: CPT | Mod: 95 | Performed by: PSYCHIATRY & NEUROLOGY

## 2022-08-24 RX ORDER — LEUPROLIDE ACETATE 1 MG/0.2ML
KIT SUBCUTANEOUS
COMMUNITY
Start: 2022-08-12 | End: 2023-06-22

## 2022-08-24 RX ORDER — SUMATRIPTAN 100 MG/1
100 TABLET, FILM COATED ORAL
Qty: 18 TABLET | Refills: 3 | Status: SHIPPED | OUTPATIENT
Start: 2022-08-24 | End: 2023-09-25

## 2022-08-24 RX ORDER — AMITRIPTYLINE HYDROCHLORIDE 10 MG/1
10 TABLET ORAL AT BEDTIME
Qty: 30 TABLET | Refills: 11 | Status: SHIPPED | OUTPATIENT
Start: 2022-08-24 | End: 2023-06-22

## 2022-08-24 RX ORDER — LETROZOLE 2.5 MG/1
1 TABLET, FILM COATED ORAL DAILY
COMMUNITY
Start: 2022-08-14 | End: 2023-06-22

## 2022-08-24 ASSESSMENT — HEADACHE IMPACT TEST (HIT 6)
HOW OFTEN DID HEADACHS LIMIT CONCENTRATION ON WORK OR DAILY ACTIVITY: VERY OFTEN
HOW OFTEN HAVE YOU FELT FED UP OR IRRITATED BECAUSE OF YOUR HEADACHES: RARELY
WHEN YOU HAVE HEADACHES HOW OFTEN IS THE PAIN SEVERE: VERY OFTEN
HOW OFTEN DO HEADACHES LIMIT YOUR DAILY ACTIVITIES: SOMETIMES
WHEN YOU HAVE A HEADACHE HOW OFTEN DO YOU WISH YOU COULD LIE DOWN: ALWAYS
HIT6 TOTAL SCORE: 61
HOW OFTEN DO HEADACHES LIMIT YOUR DAILY ACTIVITIES: SOMETIMES
HIT6 TOTAL SCORE: 61
HOW OFTEN HAVE YOU FELT FED UP OR IRRITATED BECAUSE OF YOUR HEADACHES: RARELY
HOW OFTEN HAVE YOU FELT TOO TIRED TO WORK BECAUSE OF YOUR HEADACHES: RARELY
HOW OFTEN HAVE YOU FELT TOO TIRED TO WORK BECAUSE OF YOUR HEADACHES: RARELY
HOW OFTEN DID HEADACHS LIMIT CONCENTRATION ON WORK OR DAILY ACTIVITY: VERY OFTEN
WHEN YOU HAVE A HEADACHE HOW OFTEN DO YOU WISH YOU COULD LIE DOWN: ALWAYS
WHEN YOU HAVE HEADACHES HOW OFTEN IS THE PAIN SEVERE: VERY OFTEN

## 2022-08-24 ASSESSMENT — MIGRAINE DISABILITY ASSESSMENT (MIDAS)
HOW MANY DAYS DID YOU NOT DO HOUSEWORK BECAUSE OF HEADACHES: 2
HOW MANY DAYS DID YOU MISS WORK OR SCHOOL BECAUSE OF HEADACHES: 3
HOW MANY DAYS IN THE PAST 3 MONTHS HAVE YOU HAD A HEADACHE: 5
TOTAL SCORE: 8
ON A SCALE FROM 0-10 ON AVERAGE HOW PAINFUL WERE HEADACHES: 6
HOW MANY DAYS WAS HOUSEWORK PRODUCTIVITY CUT IN HALF DUE TO HEADACHES: 0
HOW OFTEN WERE SOCIAL ACTIVITIES MISSED DUE TO HEADACHES: 3
HOW MANY DAYS WAS YOUR PRODUCTIVITY CUT IN HALF BECAUSE OF HEADACHES: 0

## 2022-08-24 NOTE — PROGRESS NOTES
Kinsey is a 42 year old who is being evaluated via a billable video visit.      How would you like to obtain your AVS? MyChart  If the video visit is dropped, the invitation should be resent by: Send to e-mail at: [email or web address]  Will anyone else be joining your video visit? No      Video-Visit Details    Video Start Time: 8:03 AM    Type of service:  Video Visit    Video End Time: 8:35 AM      Originating Location (pt. Location): Home    Distant Location (provider location):  [facility]UR[facility]     Platform used for Video Visit: Judi Delgado MRN# [id number]   Age: 42 year old YOB: 1980     Requesting physician: Chelsey Gagnon            Assessment and Plan:     (G43.009) Migraine without aura and without status migrainosus, not intractable  (primary encounter diagnosis)  Comment: The patient has multiple years of intermittent migraines triggered by hormonal fluctuation. This is a classic trigger for migraines. I do not believe head imaging is needed.     We discussed prevention and options while trying to get pregnant. Amitriptyline at low dose 10 mg should be safe right now, once pregnant she can decided her comfort level of continuing to use. In past pregnancy her migraines were better so she may not need this.     Sumatriptan is well studied in pregnancy with the most extensive registry. There have been no findings of teratogenicity and I feel this is safer and more effective than use of butalbital. Dose of 100 mg is perfectly safe to use. If hypertension develops the medicine should be stopped. There are some reports that some intrauterine growth retardation had been observed and sumatriptan should be held if growth of fetus is not as expected.     After pregnancy frovatriptan may be better for menstrual migraine.     Plan:   1. Acute: SUMAtriptan (IMITREX) 100 MG tablet can be used with Naproxen when not  pregnant if ok with fertility physician. Can be used with acetaminophen when pregnant.   2. Preventive: amitriptyline (ELAVIL) 10 MG tablet po at bedtime    Follow up in 4-5 months.          45 minutes spent caring for the patient today including video time, chart review and documentation.     Sahra Fish MD             History of Present Illness:     chief complaint:   Chief Complaint   Patient presents with     Headache     New video visit for headaches.         Kinsey Delgado is a 42 year old patient presenting for assessment of migraines. 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 She went on antidepressants and improved her sleep cycle and all that helped. Since then her symptoms have intermittently flared. She notes that Imitrex would help and birth control.     In 0579-7477 in grad school migraines got more severe and would cause her to lay down and miss a day of work. These debilitating migraines have continued to this day and while undergoing IVF things have become worse mainly due to her hormonal triggers.     She started tracking migraines and noticed it around period (starts day before period). She feels fatigued. Pain comes later that day. Then 3 days of migraine.     On IVF treatments. One more embryo transfer scheduled for beginning of November. Using Lupron and Femara started now.       Current headache symptoms:  Frequency: unpredictable now b/c of IVF< typically around menstrual cycle.   Quality: throbbing  Location: pain in neck muscle and up the head on right side  Duration: 3 days  Associated symptoms: fatigue, nausea (mild), photophobia and phonophobia  Awakens from sleep due to sx's:  Yes  Precipitating Injury:  No    Triggers: Hormones    Previous Treatment Trials:  Acute therapies:  Imitrex takes the edge off, lays down. 100 mg works better than 50 mg but wasn't prescribed  Butalbital/APAP use din past not effective    Chronic  therapies:  Magnesium-not sure if it helps, causes GI upset taking Magnesium Oxide             Physical Exam:     Limited due to video  She is awake and alert. Normal attention and and recall. No aphasia or dysarthria  Face is symmetric with normal movement.   Normal coordination observed, no tremor           Pertinent history/data     Note from Dr Glo Venegas 5/24/2022 reviewed.   No prior head imaging.

## 2022-08-24 NOTE — LETTER
8/24/2022       RE: Kinsey Delgado  [address] [place] [address]     Dear Colleague,    Thank you for referring your patient, Kinsey Delgado, to the [facility]IC [place] [facility]. Please see a copy of my visit note below.            Kinsey Delgado MRN# [id number]   Age: 42 year old YOB: 1980     Requesting physician: Chelsey Gagnon            Assessment and Plan:     (G43.009) Migraine without aura and without status migrainosus, not intractable  (primary encounter diagnosis)  Comment: The patient has multiple years of intermittent migraines triggered by hormonal fluctuation. This is a classic trigger for migraines. I do not believe head imaging is needed.     We discussed prevention and options while trying to get pregnant. Amitriptyline at low dose 10 mg should be safe right now, once pregnant she can decided her comfort level of continuing to use. In past pregnancy her migraines were better so she may not need this.     Sumatriptan is well studied in pregnancy with the most extensive registry. There have been no findings of teratogenicity and I feel this is safer and more effective than use of butalbital. Dose of 100 mg is perfectly safe to use. If hypertension develops the medicine should be stopped. There are some reports that some intrauterine growth retardation had been observed and sumatriptan should be held if growth of fetus is not as expected.     After pregnancy frovatriptan may be better for menstrual migraine.     Plan:   1. Acute: SUMAtriptan (IMITREX) 100 MG tablet can be used with Naproxen when not pregnant if ok with fertility physician. Can be used with acetaminophen when pregnant.   2. Preventive: amitriptyline (ELAVIL) 10 MG tablet po at bedtime    Follow up in 4-5 months.          45 minutes spent caring for the patient today including video time, chart review and  documentation.     Sahra Fish MD             History of Present Illness:     chief complaint:   Chief Complaint   Patient presents with     Headache     New video visit for headaches.         Kinsey Delgado is a 42 year old patient presenting for assessment of migraines. 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 She went on antidepressants and improved her sleep cycle and all that helped. Since then her symptoms have intermittently flared. She notes that Imitrex would help and birth control.     In 6560-1236 in grad school migraines got more severe and would cause her to lay down and miss a day of work. These debilitating migraines have continued to this day and while undergoing IVF things have become worse mainly due to her hormonal triggers.     She started tracking migraines and noticed it around period (starts day before period). She feels fatigued. Pain comes later that day. Then 3 days of migraine.     On IVF treatments. One more embryo transfer scheduled for beginning of November. Using Lupron and Femara started now.       Current headache symptoms:  Frequency: unpredictable now b/c of IVF< typically around menstrual cycle.   Quality: throbbing  Location: pain in neck muscle and up the head on right side  Duration: 3 days  Associated symptoms: fatigue, nausea (mild), photophobia and phonophobia  Awakens from sleep due to sx's:  Yes  Precipitating Injury:  No    Triggers: Hormones    Previous Treatment Trials:  Acute therapies:  Imitrex takes the edge off, lays down. 100 mg works better than 50 mg but wasn't prescribed  Butalbital/APAP use din past not effective    Chronic therapies:  Magnesium-not sure if it helps, causes GI upset taking Magnesium Oxide             Physical Exam:     Limited due to video  She is awake and alert. Normal attention and and recall. No aphasia or dysarthria  Face is symmetric with normal movement.   Normal coordination observed, no  tremor           Pertinent history/data     Note from Dr Glo Venegas 5/24/2022 reviewed.   No prior head imaging.         Sincerely,    Sahra Fish MD

## 2022-12-26 ENCOUNTER — HEALTH MAINTENANCE LETTER (OUTPATIENT)
Age: 42
End: 2022-12-26

## 2023-03-11 ENCOUNTER — APPOINTMENT (OUTPATIENT)
Dept: GENERAL RADIOLOGY | Facility: CLINIC | Age: 43
End: 2023-03-11
Attending: EMERGENCY MEDICINE
Payer: COMMERCIAL

## 2023-03-11 ENCOUNTER — HOSPITAL ENCOUNTER (EMERGENCY)
Facility: CLINIC | Age: 43
Discharge: HOME | End: 2023-03-11
Attending: EMERGENCY MEDICINE | Admitting: EMERGENCY MEDICINE
Payer: COMMERCIAL

## 2023-03-11 VITALS
RESPIRATION RATE: 16 BRPM | TEMPERATURE: 98.7 F | SYSTOLIC BLOOD PRESSURE: 133 MMHG | DIASTOLIC BLOOD PRESSURE: 85 MMHG | OXYGEN SATURATION: 98 % | HEART RATE: 65 BPM

## 2023-03-11 DIAGNOSIS — S52.121A CLOSED DISPLACED FRACTURE OF HEAD OF RIGHT RADIUS, INITIAL ENCOUNTER: ICD-10-CM

## 2023-03-11 PROCEDURE — 73110 X-RAY EXAM OF WRIST: CPT | Mod: RT

## 2023-03-11 PROCEDURE — 73090 X-RAY EXAM OF FOREARM: CPT | Mod: RT

## 2023-03-11 PROCEDURE — 99284 EMERGENCY DEPT VISIT MOD MDM: CPT | Mod: 25 | Performed by: EMERGENCY MEDICINE

## 2023-03-11 PROCEDURE — 73080 X-RAY EXAM OF ELBOW: CPT | Mod: 26 | Performed by: RADIOLOGY

## 2023-03-11 PROCEDURE — 73110 X-RAY EXAM OF WRIST: CPT | Mod: 26 | Performed by: RADIOLOGY

## 2023-03-11 PROCEDURE — 24650 CLTX RDL HEAD/NCK FX WO MNPJ: CPT | Mod: RT | Performed by: EMERGENCY MEDICINE

## 2023-03-11 PROCEDURE — 73080 X-RAY EXAM OF ELBOW: CPT | Mod: RT

## 2023-03-11 PROCEDURE — 24650 CLTX RDL HEAD/NCK FX WO MNPJ: CPT | Mod: 54 | Performed by: EMERGENCY MEDICINE

## 2023-03-11 PROCEDURE — 250N000013 HC RX MED GY IP 250 OP 250 PS 637: Performed by: EMERGENCY MEDICINE

## 2023-03-11 PROCEDURE — 99285 EMERGENCY DEPT VISIT HI MDM: CPT | Mod: 25 | Performed by: EMERGENCY MEDICINE

## 2023-03-11 PROCEDURE — 73090 X-RAY EXAM OF FOREARM: CPT | Mod: 26 | Performed by: RADIOLOGY

## 2023-03-11 RX ORDER — OXYCODONE HYDROCHLORIDE 5 MG/1
5 TABLET ORAL ONCE
Status: COMPLETED | OUTPATIENT
Start: 2023-03-11 | End: 2023-03-11

## 2023-03-11 RX ORDER — OXYCODONE HYDROCHLORIDE 5 MG/1
5 TABLET ORAL EVERY 6 HOURS PRN
Qty: 15 TABLET | Refills: 0 | Status: SHIPPED | OUTPATIENT
Start: 2023-03-11 | End: 2023-03-14

## 2023-03-11 RX ADMIN — OXYCODONE HYDROCHLORIDE 5 MG: 5 TABLET ORAL at 12:25

## 2023-03-11 ASSESSMENT — ACTIVITIES OF DAILY LIVING (ADL): ADLS_ACUITY_SCORE: 35

## 2023-03-11 NOTE — ED TRIAGE NOTES
Slipped on ice around 10am. Fell on right side. C/o right arm pain. Struck head on right side. Denies LOC. No obvious sign of deformities. Pulses intact

## 2023-03-11 NOTE — ED PROVIDER NOTES
ED Provider Note  [facility]      History     Chief Complaint   Patient presents with     Fall     HPI  Kinsey Delgado is a 42 year old female who presents to the ED for evaluation of right arm pain after fall on ice around 10 am. She complains of pain with supination and pronation as well as movement of her elbow. She denies any pain anywhere else and denies hitting her head or any LOC. She rates the pain a 7/10 and has not tried anything for it.    Past Medical History  Past Medical History:   Diagnosis Date     Female infertility      Migraine      Past Surgical History:   Procedure Laterality Date     DILATION AND CURETTAGE SUCTION N/A 2022    Procedure: DILATION AND CURETTAGE, UTERUS, USING SUCTION;  Surgeon: Tanvi Sandoval MD;  Location: [facility] OR     NO HISTORY OF SURGERY       oxyCODONE (ROXICODONE) 5 MG tablet  amitriptyline (ELAVIL) 10 MG tablet  Docosahexaenoic Acid (DHA) 200 MG capsule  letrozole (FEMARA) 2.5 MG tablet  leuprolide acetate (LUPRON) 1 MG/0.2ML kit  magnesium 250 MG tablet  Prenatal Vit-Fe Fumarate-FA (PRENATAL VITAMINS PO)  SUMAtriptan (IMITREX) 100 MG tablet  Vitamin D (Cholecalciferol) 25 MCG (1000 UT) CAPS      Allergies   Allergen Reactions     Seasonal Allergies Other (See Comments)     Itchy eyes, runny nose, sneezing     Family History  Family History   Problem Relation Age of Onset     Hypertension Father      Hyperlipidemia Father      Colon Cancer Maternal Grandfather      Other Cancer Maternal Grandmother         Ovarian cancer     Other Cancer Paternal Grandfather         Lung cancer     Social History   Social History     Tobacco Use     Smoking status: Former     Packs/day: 0.50     Years: 10.00     Pack years: 5.00     Types: Cigarettes     Start date: 2000     Quit date: 2009     Years since quittin.7     Smokeless tobacco: Never   Vaping Use     Vaping Use: Never used   Substance Use Topics     Alcohol use: Not Currently      Comment: social      Drug use: Never         A medically appropriate review of systems was performed with pertinent positives and negatives noted in the HPI, and all other systems negative.    Physical Exam   BP: 133/85  Pulse: 70  Temp: 98.7  F (37.1  C)  Resp: 17  SpO2: 98 %  Physical Exam  Vitals and nursing note reviewed.   Constitutional:       General: She is not in acute distress.     Appearance: She is not toxic-appearing.   HENT:      Head: Normocephalic and atraumatic.   Cardiovascular:      Rate and Rhythm: Normal rate and regular rhythm.      Heart sounds: Normal heart sounds.   Pulmonary:      Effort: Pulmonary effort is normal.      Breath sounds: Normal breath sounds.   Abdominal:      General: Abdomen is flat.      Palpations: Abdomen is soft.      Tenderness: There is no abdominal tenderness. There is no guarding or rebound.   Musculoskeletal:      Right elbow: Tenderness present in radial head. No medial epicondyle, lateral epicondyle or olecranon process tenderness.      Right forearm: Normal.      Right wrist: Normal.      Cervical back: Normal range of motion and neck supple. No tenderness.   Skin:     General: Skin is warm and dry.   Neurological:      Mental Status: She is alert.         ED Course, Procedures, & Data      Procedures       ED Course Selections: A consult was attained from the orthopedics service. The case was discussed with the resident from that service. They are recommending a sling and f/u with orthopedics in one week.                 Results for orders placed or performed during the hospital encounter of 03/11/23   Elbow XR, G/E 3 views, right     Status: None    Narrative    EXAM: XR ELBOW RIGHT G/E 3 VIEWS, XR FOREARM RIGHT 2 VIEWS  LOCATION: [facility]  DATE/TIME: 03/11/2023, 1:05 PM    INDICATION: Elbow pain. Radial head fracture.  COMPARISON: None.      Impression    IMPRESSION: Mildly depressed intra-articular fracture  right radial head. Anatomic alignment right elbow. No distal humerus or proximal ulna fracture. No significant elbow joint space narrowing. Elbow joint effusion.    Anatomic alignment right forearm. Mid to distal radius and ulna intact.    NOTE: ABNORMAL REPORT    THE DICTATION ABOVE DESCRIBES AN ABNORMALITY FOR WHICH FOLLOW-UP IS NEEDED.        Radius/Ulna XR, PA & LAT, right     Status: None    Narrative    EXAM: XR ELBOW RIGHT G/E 3 VIEWS, XR FOREARM RIGHT 2 VIEWS  LOCATION: [facility]  DATE/TIME: 03/11/2023, 1:05 PM    INDICATION: Elbow pain. Radial head fracture.  COMPARISON: None.      Impression    IMPRESSION: Mildly depressed intra-articular fracture right radial head. Anatomic alignment right elbow. No distal humerus or proximal ulna fracture. No significant elbow joint space narrowing. Elbow joint effusion.    Anatomic alignment right forearm. Mid to distal radius and ulna intact.    NOTE: ABNORMAL REPORT    THE DICTATION ABOVE DESCRIBES AN ABNORMALITY FOR WHICH FOLLOW-UP IS NEEDED.        XR Wrist Right G/E 3 Views     Status: None    Narrative    EXAM: XR WRIST RIGHT G/E 3 VIEWS  LOCATION: [facility]  DATE/TIME: 3/11/2023 1:09 PM    INDICATION: Right wrist pain.  COMPARISON: None available.      Impression    IMPRESSION: Anatomic alignment. No acute displaced fracture. No significant joint space narrowing. No localizing soft tissue swelling.     Medications   oxyCODONE (ROXICODONE) tablet 5 mg (5 mg Oral $Given 3/11/23 1225)     Labs Ordered and Resulted from Time of ED Arrival to Time of ED Departure - No data to display  XR Wrist Right G/E 3 Views   Final Result   IMPRESSION: Anatomic alignment. No acute displaced fracture. No significant joint space narrowing. No localizing soft tissue swelling.      Radius/Ulna XR, PA & LAT, right   Final Result   IMPRESSION: Mildly depressed intra-articular fracture right  radial head. Anatomic alignment right elbow. No distal humerus or proximal ulna fracture. No significant elbow joint space narrowing. Elbow joint effusion.      Anatomic alignment right forearm. Mid to distal radius and ulna intact.      NOTE: ABNORMAL REPORT      THE DICTATION ABOVE DESCRIBES AN ABNORMALITY FOR WHICH FOLLOW-UP IS NEEDED.             Elbow XR, G/E 3 views, right   Final Result   IMPRESSION: Mildly depressed intra-articular fracture right radial head. Anatomic alignment right elbow. No distal humerus or proximal ulna fracture. No significant elbow joint space narrowing. Elbow joint effusion.      Anatomic alignment right forearm. Mid to distal radius and ulna intact.      NOTE: ABNORMAL REPORT      THE DICTATION ABOVE DESCRIBES AN ABNORMALITY FOR WHICH FOLLOW-UP IS NEEDED.                    Critical care was not performed.     Medical Decision Making  The patient's presentation was of low complexity (an acute and uncomplicated illness or injury).    The patient's evaluation involved:  ordering and/or review of 3+ test(s) in this encounter (see separate area of note for details)    The patient's management necessitated moderate risk (prescription drug management including medications given in the ED).      Assessment & Plan    43 yo female here with R elbow pain after a fall on the ice secondary to a mildly displaced radial head fracture on xray. Forearm and wrist xrays negative. I discussed the case with orthopedics who reviewed the films with me and are recommending a sling and f/u with them in one week. She will be discharged to home with some oxycodone and appropriate f/u.    I have reviewed the nursing notes. I have reviewed the findings, diagnosis, plan and need for follow up with the patient.    Discharge Medication List as of 3/11/2023  2:52 PM      START taking these medications    Details   oxyCODONE (ROXICODONE) 5 MG tablet Take 1 tablet (5 mg) by mouth every 6 hours as needed for pain,  Disp-15 tablet, R-0, Local Print             Final diagnoses:   Closed displaced fracture of head of right radius, initial encounter       Jay Hong MD  [facility] EMERGENCY DEPARTMENT  3/11/2023     Jay Hong MD  03/11/23 173

## 2023-03-11 NOTE — DISCHARGE INSTRUCTIONS
Take the oxycodone as prescribed. You can take ibuprofen 800 mg with meals three times daily. You can also take acetaminophen as needed for pain. Follow up with orthopedics in one week.

## 2023-03-11 NOTE — PROGRESS NOTES
Paged regarding immobilization, and weightbearing restrictions  Patient was not evaluated in person.   The information below was provided via chart review, and the emergency department physician.    Kinsey Delgado is a 42-year-old female who presented to the ED with right arm pain following a fall on ice.  On presentation, patient complained of pain with mobilization of the elbow however, was able to perform supination/pronation.  XR revealed a minimally displaced radial head fracture.  No additional injuries were identified.    Given the minimally displaced nature of patient's fracture with preservation of patient's ability to perform supination/pronation, recommend nonoperative management at this point time.    Plan:  Immobilization utilizing a sling  Nonweightbearing right upper extremity  Follow-up orthopedic clinic 1 week with repeat imaging right elbow    Deya Hyatt MD  Orthopaedic Surgery, PGY-5      ordered

## 2023-03-12 ENCOUNTER — NURSE TRIAGE (OUTPATIENT)
Dept: NURSING | Facility: CLINIC | Age: 43
End: 2023-03-12
Payer: COMMERCIAL

## 2023-03-12 NOTE — TELEPHONE ENCOUNTER
Pt fx radius of right arm, seen in ED 3/12/23, pt to see Orthopedics in one week.  Pt calling that last evening when taking off sweatshirt she really tugged on arm and it hurt worse.  Pt calling for Care Advice.  Pt will f/u with Orthopedics tomorrow to schedule appt.  Perlita Dozier RN  [facility] Nurse Advisor    Additional Information    Negative: Serious injury with multiple fractures    Negative: [1] Major bleeding (actively bleeding or spurting) AND [2] can't be stopped    Negative: [1] Large blood loss AND [2] fainted or too weak to stand    Negative: Amputation or bone sticking through the skin    Negative: Sounds like a life-threatening emergency to the triager    Negative: Finger injury is main concern    Negative: Elbow injury    Negative: Muscle pain caused by excessive exercise or work (overuse)    Negative: Arm or hand pain not caused by an injury    Negative: Wound infection suspected (cut or other wound now looks infected)    Negative: Looks like a broken bone (crooked or deformed)    Negative: Can't move wrist at all    Negative: Severe pain when tries to move wrist    Negative: [1] Skin beyond injury is pale or blue AND [2] begins within 2 hours of injury (Exception: bleeding into the skin)    Negative: New numbness or tingling in fingers now    Negative: [1] Bleeding AND [2] won't stop after 10 minutes of direct pressure (using correct technique)    Negative: Skin is split open or gaping (if unsure, refer in if cut length > 1/2 inch or 12 mm)    Negative: [1] Tourniquet around wrist or hand AND [2] caller can't remove AND [3] symptoms (e.g., color change, pain, numbness)    Negative: Sounds like a serious injury to the triager    Negative: [1] Age < 6 years old AND [2] can't straighten elbow completely    Negative: Cut over knuckle of hand (MCP joint)    Negative: Crush type injury (such as wringer injury)    Negative: Suspicious history for the injury (especially if not yet crawling)    Negative: [1]  SEVERE pain AND [2] not improved 2 hours after pain medicine/ice packs    Negative: [1] After day 2 AND [2] pain at site of injury becomes worse AND [3] unexplained fever occurs    Negative: Large swelling or bruise (> 2 inches or 5 cm)    Negative: Can't move injured wrist normally (can't rotate palm up and down or flex/extend wrist (move hand up/down)    Negative: Can't use injured hand normally (can't make a fist and open fully)    Negative: [1] DIRTY minor wound AND [2] 2 or less tetanus shots (such as vaccine refusers)    Negative: [1] DIRTY cut or scrape AND [2] last tetanus shot > 5 years ago    Negative: [1] CLEAN cut or scrape AND [2] last tetanus shot > 10 years ago    Negative: [1] After 3 days AND [2] pain not improved    Negative: [1] After 2 weeks AND [2] still painful    Negative: Minor injury from direct blow    Negative: [1] Pain in wrist or hand BUT [2] moves wrist and hand normally    Negative: Small cut or scrape also present    Negative: [1] Tourniquet around wrist area AND [2] probably can be removed by caller (or already removed)    Negative: [1] Transient wrist or hand pain AND [2] no visible injury    Protocols used: WRIST OR HAND INJURY-P-

## 2023-03-13 ENCOUNTER — TELEPHONE (OUTPATIENT)
Dept: ORTHOPEDICS | Facility: CLINIC | Age: 43
End: 2023-03-13
Payer: COMMERCIAL

## 2023-03-13 DIAGNOSIS — M25.529 ELBOW PAIN: Primary | ICD-10-CM

## 2023-03-13 NOTE — TELEPHONE ENCOUNTER
[facility] Call Center    Phone Message    May a detailed message be left on voicemail: yes     Reason for Call Patient was at ER this last Weekend . She need a Appointment . Please Review Images. Action Taken: Message routed to:  [facility] edwar    Travel Screening: Not Applicable

## 2023-03-13 NOTE — TELEPHONE ENCOUNTER
Pt has CT schedule for 3/20. Next available opening with Sports Med is 3/27, is there anything sooner pt can get into after having CT done? Please advise.

## 2023-03-13 NOTE — CONFIDENTIAL NOTE
Orthopedic/Sports Medicine Fracture Triage    Incoming call/or message from {EJFX1:[id number]}.    Fracture type: {EJ FX2:646183}.    The patient is in a  {EJFX6:914110}.    Date of injury ***.    Triaged by: {EJFX3:[id number]}.    Determined to be managed {EJFX4:587135}.    Needs to be seen {EJFX5:742254}.    Additional Comments/information: ***    Maria L Simon LPN

## 2023-03-13 NOTE — TELEPHONE ENCOUNTER
DIAGNOSIS: Closed displaced fracture of head of right radius; CT scan results (CT scan at 7:20AM)- OK per SE   APPOINTMENT DATE: 3/20/23   NOTES STATUS DETAILS   DISCHARGE REPORT from the ER Internal 3/11/23 - [facility] ED - Dr. Hong    MEDICATION LIST Internal    CT SCAN Internal 3/20/23 - Pending prior to appt CT Elbow Right   XRAYS (IMAGES & REPORTS) Internal 3/11/23 - XR Wrist R, XR Forearm R, XR Elbow R

## 2023-03-13 NOTE — TELEPHONE ENCOUNTER
Orthopedic/Sports Medicine Fracture Triage    Incoming call/or message from ortho resident staff message.    Fracture type: Elbow.    The patient is in a  sling.    Date of injury 3/11/23.    Triaged by: Dr. Whatley.    Determined to be managed Non operatively?    Needs to be seen ASAP    Additional Comments/information: Dr. Whatley, states that a CT scan should be completed to detemine if non-op or surgical. PT can get in with sports to start if need to get the CT scheduled and determine what route.     Maria L Simon LPN

## 2023-03-13 NOTE — TELEPHONE ENCOUNTER
After discussing with Dr. Whatley, I called the patient and offered her an appointment with Dr. Whatley on 3/20/23 at 8 AM following her CT scan at 7:20 AM. At that time, it will be decided if her fracture needs to be treated surgically or conservatively. Patient understood. I confirmed the time and location of the upcoming appointment. All questions were answered at this time. Sahra Lutz, ATC on 3/13/2023 at 3:43 PM

## 2023-03-15 ENCOUNTER — TRANSFERRED RECORDS (OUTPATIENT)
Dept: HEALTH INFORMATION MANAGEMENT | Facility: CLINIC | Age: 43
End: 2023-03-15
Payer: COMMERCIAL

## 2023-03-20 ENCOUNTER — PRE VISIT (OUTPATIENT)
Dept: ORTHOPEDICS | Facility: CLINIC | Age: 43
End: 2023-03-20

## 2023-03-22 ENCOUNTER — TRANSFERRED RECORDS (OUTPATIENT)
Dept: HEALTH INFORMATION MANAGEMENT | Facility: CLINIC | Age: 43
End: 2023-03-22
Payer: COMMERCIAL

## 2023-03-29 ENCOUNTER — TRANSFERRED RECORDS (OUTPATIENT)
Dept: HEALTH INFORMATION MANAGEMENT | Facility: CLINIC | Age: 43
End: 2023-03-29
Payer: COMMERCIAL

## 2023-04-26 ENCOUNTER — TRANSFERRED RECORDS (OUTPATIENT)
Dept: HEALTH INFORMATION MANAGEMENT | Facility: CLINIC | Age: 43
End: 2023-04-26
Payer: COMMERCIAL

## 2023-06-22 ENCOUNTER — OFFICE VISIT (OUTPATIENT)
Dept: FAMILY MEDICINE | Facility: CLINIC | Age: 43
End: 2023-06-22
Payer: COMMERCIAL

## 2023-06-22 VITALS
WEIGHT: 141 LBS | DIASTOLIC BLOOD PRESSURE: 85 MMHG | HEART RATE: 72 BPM | HEIGHT: 62 IN | OXYGEN SATURATION: 97 % | TEMPERATURE: 98.3 F | BODY MASS INDEX: 25.95 KG/M2 | RESPIRATION RATE: 18 BRPM | SYSTOLIC BLOOD PRESSURE: 137 MMHG

## 2023-06-22 DIAGNOSIS — M26.623 BILATERAL TEMPOROMANDIBULAR JOINT PAIN: ICD-10-CM

## 2023-06-22 DIAGNOSIS — Z00.00 ROUTINE GENERAL MEDICAL EXAMINATION AT A HEALTH CARE FACILITY: Primary | ICD-10-CM

## 2023-06-22 PROBLEM — G44.209 TENSION-TYPE HEADACHE: Status: ACTIVE | Noted: 2023-03-22

## 2023-06-22 PROBLEM — O09.529 SUPERVISION OF HIGH-RISK PREGNANCY OF ELDERLY MULTIGRAVIDA: Status: RESOLVED | Noted: 2022-01-18 | Resolved: 2023-06-22

## 2023-06-22 PROBLEM — O44.01 PLACENTA PREVIA ANTEPARTUM IN FIRST TRIMESTER: Status: RESOLVED | Noted: 2022-01-18 | Resolved: 2023-06-22

## 2023-06-22 PROBLEM — O09.811 PREGNANCY RESULTING FROM IN VITRO FERTILIZATION IN FIRST TRIMESTER: Status: RESOLVED | Noted: 2022-01-18 | Resolved: 2023-06-22

## 2023-06-22 PROBLEM — G43.109 MIGRAINE WITH AURA: Status: ACTIVE | Noted: 2023-03-22

## 2023-06-22 PROBLEM — M26.632 ARTICULAR DISC DISORDER OF LEFT TEMPOROMANDIBULAR JOINT: Status: ACTIVE | Noted: 2023-03-22

## 2023-06-22 PROBLEM — G43.009 MIGRAINE WITHOUT AURA AND WITHOUT STATUS MIGRAINOSUS, NOT INTRACTABLE: Status: RESOLVED | Noted: 2021-08-31 | Resolved: 2023-06-22

## 2023-06-22 PROBLEM — M54.2 NECK PAIN: Status: ACTIVE | Noted: 2023-03-22

## 2023-06-22 PROBLEM — M79.18 MYOFASCIAL PAIN: Status: ACTIVE | Noted: 2023-03-22

## 2023-06-22 PROBLEM — G43.009 MIGRAINE WITHOUT AURA AND WITHOUT STATUS MIGRAINOSUS, NOT INTRACTABLE: Status: ACTIVE | Noted: 2021-08-31

## 2023-06-22 PROCEDURE — 99396 PREV VISIT EST AGE 40-64: CPT | Performed by: FAMILY MEDICINE

## 2023-06-22 RX ORDER — ALBUTEROL SULFATE 90 UG/1
AEROSOL, METERED RESPIRATORY (INHALATION)
COMMUNITY

## 2023-06-22 RX ORDER — METHOCARBAMOL 750 MG/1
TABLET, FILM COATED ORAL
COMMUNITY

## 2023-06-22 ASSESSMENT — ENCOUNTER SYMPTOMS
CHILLS: 0
HEADACHES: 0
FEVER: 0
DYSURIA: 0
PALPITATIONS: 0
DIARRHEA: 1
ARTHRALGIAS: 0
EYE PAIN: 0
DIZZINESS: 0
SHORTNESS OF BREATH: 0
JOINT SWELLING: 0
NAUSEA: 0
ABDOMINAL PAIN: 0
NERVOUS/ANXIOUS: 0
COUGH: 0
HEARTBURN: 0
PARESTHESIAS: 0
FREQUENCY: 0
WEAKNESS: 0
MYALGIAS: 0
SORE THROAT: 0
HEMATURIA: 0
HEMATOCHEZIA: 0
CONSTIPATION: 0

## 2023-06-22 NOTE — PROGRESS NOTES
SUBJECTIVE:   CC: Kinsey is an 42 year old who presents for preventive health visit.       2023     1:15 PM   Additional Questions   Roomed by Sanna JOHNSON     Healthy Habits:     Getting at least 3 servings of Calcium per day:  Yes    Bi-annual eye exam:  NO    Dental care twice a year:  Yes    Sleep apnea or symptoms of sleep apnea:  None    Diet:  Regular (no restrictions)    Frequency of exercise:  4-5 days/week    Duration of exercise:  30-45 minutes    Taking medications regularly:  Yes    Medication side effects:  None    PHQ-2 Total Score: 0    Additional concerns today:  Yes    Today's PHQ-2 Score:       2023     1:11 PM   PHQ-2 (  [employer])   Q1: Little interest or pleasure in doing things 0   Q2: Feeling down, depressed or hopeless 0   PHQ-2 Score 0   Q1: Little interest or pleasure in doing things Not at all   Q2: Feeling down, depressed or hopeless Not at all   PHQ-2 Score 0           Social History     Tobacco Use     Smoking status: Former     Packs/day: 0.50     Years: 10.00     Pack years: 5.00     Types: Cigarettes     Start date: 2000     Quit date: 2009     Years since quittin.0     Smokeless tobacco: Never   Substance Use Topics     Alcohol use: Not Currently     Comment: social              2023     1:10 PM   Alcohol Use   Prescreen: >3 drinks/day or >7 drinks/week? No     Reviewed orders with patient.  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     Bilateral temporomandibular joint pain     Depression with anxiety      Myofascial pain     Neck pain     Tension-type headache     Vitamin D deficiency     Past Surgical History:   Procedure Laterality Date     DILATION AND CURETTAGE SUCTION N/A 2022    Procedure: DILATION AND CURETTAGE, UTERUS, USING SUCTION;  Surgeon: Tanvi Sandoval MD;  Location: [facility] OR     NO HISTORY OF SURGERY         Social History     Tobacco Use     Smoking status: Former     Packs/day: 0.50     Years: 10.00     Pack years: 5.00     Types: Cigarettes     Start date: 2000     Quit date: 2009     Years since quittin.0     Smokeless tobacco: Never   Substance Use Topics     Alcohol use: Not Currently     Comment: social      Family History   Problem Relation Age of Onset     Hypertension Father      Hyperlipidemia Father      Colon Cancer Maternal Grandfather      Other Cancer Maternal Grandmother         Ovarian cancer     Other Cancer Paternal Grandfather         Lung cancer         Current Outpatient Medications   Medication Sig Dispense Refill     Multiple Vitamin (MULTI-VITAMIN DAILY PO)        SUMAtriptan (IMITREX) 100 MG tablet Take 1 tablet (100 mg) by mouth at onset of headache for migraine May repeat in 2 hours. Max 4 tablets/24 hours. 18 tablet 3     albuterol (PROAIR HFA/PROVENTIL HFA/VENTOLIN HFA) 108 (90 Base) MCG/ACT inhaler albuterol sulfate HFA 90 mcg/actuation aerosol inhaler   INHALE 1-2 PUFFS BY MOUTH EVERY 4 TO 6 HOURS AS NEEDED       methocarbamol (ROBAXIN) 750 MG tablet methocarbamol 750 mg tablet   TAKE 1 TABLET BY MOUTH AT BEDTIME AS NEEDED       Vitamin D (Cholecalciferol) 25 MCG (1000 UT) CAPS  (Patient not taking: Reported on 2023)       Allergies   Allergen Reactions     Seasonal Allergies Other (See Comments)     Itchy eyes, runny nose, sneezing     Recent Labs   Lab Test 20  0831 19  0914   TRIG  --  75   TSH 1.11  --         Breast Cancer Screening:    FHS-7:       2022    11:17 AM 2023     1:11 PM   Breast CA Risk Assessment (FHS-7)    Did any of your first-degree relatives have breast or ovarian cancer? No No   Did any of your relatives have bilateral breast cancer? No No   Did any man in your family have breast cancer? No No   Did any woman in your family have breast and ovarian cancer? No Yes   Did any woman in your family have breast cancer before age 50 y? No No   Do you have 2 or more relatives with breast and/or ovarian cancer? No No   Do you have 2 or more relatives with breast and/or bowel cancer? No No    she reports 2 prior normal mammograms,    History of abnormal Pap smear: NO - age 30-65 PAP every 5 years with negative HPV co-testing recommended     Reviewed and updated as needed this visit by clinical staff   Tobacco  Allergies  Meds              Reviewed and updated as needed this visit by Provider                 Past Medical History:   Diagnosis Date     Female infertility      Migraine       Past Surgical History:   Procedure Laterality Date     DILATION AND CURETTAGE SUCTION N/A 2022    Procedure: DILATION AND CURETTAGE, UTERUS, USING SUCTION;  Surgeon: Tanvi Sandoval MD;  Location: [facility] OR     OB History    Para Term  AB Living   2 1 1 0 0 1   SAB IAB Ectopic Multiple Live Births   0 0 0 0 1      # Outcome Date GA Lbr Noah/2nd Weight Sex Delivery Anes PTL Lv   2             1 Term 17 41w0d  3.137 kg (6 lb 14.7 oz) F Vag-Spont EPI N JOLEEN      Name: IRVING DURANT      Apgar1: 8  Apgar5: 9     Review of Systems   Constitutional: Negative for chills and fever.   HENT: Negative for congestion, ear pain, hearing loss and sore throat.    Eyes: Negative for pain and visual disturbance.   Respiratory: Negative for cough and shortness of breath.    Cardiovascular: Negative for chest pain, palpitations and peripheral edema.   Gastrointestinal: Positive for diarrhea. Negative for abdominal pain, constipation, heartburn, hematochezia and nausea.   Genitourinary: Negative for dysuria, frequency,  "genital sores, hematuria and urgency.   Musculoskeletal: Negative for arthralgias, joint swelling and myalgias.   Skin: Negative for rash.   Neurological: Negative for dizziness, weakness, headaches and paresthesias.   Psychiatric/Behavioral: Negative for mood changes. The patient is not nervous/anxious.       OBJECTIVE:   /85   Pulse 72   Temp 98.3  F (36.8  C) (Oral)   Resp 18   Ht 1.562 m (5' 1.5\")   Wt 64 kg (141 lb)   LMP 06/05/2023 (Approximate)   SpO2 97%   BMI 26.21 kg/m    Physical Exam  GENERAL: healthy, alert and no distress  EYES: Eyes grossly normal to inspection, PERRL and conjunctivae and sclerae normal  HENT: ear canals and TM's normal, nose and mouth without ulcers or lesions  NECK: no adenopathy, no asymmetry, masses, or scars and thyroid normal to palpation  RESP: lungs clear to auscultation - no rales, rhonchi or wheezes  BREAST: normal without masses, tenderness or nipple discharge and no palpable axillary masses or adenopathy  CV: regular rate and rhythm, normal S1 S2, no S3 or S4, no murmur, click or rub, no peripheral edema and peripheral pulses strong  ABDOMEN: soft, nontender, no hepatosplenomegaly, no masses and bowel sounds normal  MS: no gross musculoskeletal defects noted, no edema  SKIN: no suspicious lesions or rashes  NEURO: Normal strength and tone, mentation intact and speech normal  PSYCH: mentation appears normal, affect normal/bright    ASSESSMENT/PLAN:   (Z00.00) Routine general medical examination at a health care facility  (primary encounter diagnosis)  routine    (M26.623) Bilateral temporomandibular joint pain  She will work with dentist to get a stent approved, can certainly let me know if she needs any paperwork completed by me.    Patient has been advised of split billing requirements and indicates understanding: Yes    COUNSELING:  Reviewed preventive health counseling, as reflected in patient instructions    BMI:   Estimated body mass index is 26.21 kg/m  " "as calculated from the following:    Height as of this encounter: 1.562 m (5' 1.5\").    Weight as of this encounter: 64 kg (141 lb).   Weight management plan: Discussed healthy diet and exercise guidelines    She reports that she quit smoking about 14 years ago. Her smoking use included cigarettes. She started smoking about 23 years ago. She has a 5.00 pack-year smoking history. She has never used smokeless tobacco.        Leyla Garay MD  [facility]  "

## 2023-06-22 NOTE — PATIENT INSTRUCTIONS
Let me know if you want an OB Referral for possible Mirena IUD.  Condoms always an option!    Preventive Health Recommendations  Female Ages 40 to 49    Yearly exam:   See your health care provider every year in order to  Review health changes.   Discuss preventive care.    Review your medicines if your doctor prescribed any.  Get a Pap test with HPV screening every 5 years.    You should be tested each year for STDs (sexually transmitted diseases), if you're at risk.   Ask your doctor if you should have a mammogram.    Have a colonoscopy (test for colon cancer) if someone in your family has had colon cancer or polyps before age 50.     Have a cholesterol test every 5 years.     Have a diabetes test (fasting glucose) after age 45. If you are at risk for diabetes, you should have this test every 3 years.    Shots: Get a flu shot each year. Get a tetanus shot every 10 years.     Nutrition:   Eat at least 5 servings of fruits and vegetables each day.  Eat whole-grain bread, whole-wheat pasta and brown rice instead of white grains and rice.  Get adequate Calcium and Vitamin D.      Lifestyle  Exercise at least 150 minutes a week (an average of 30 minutes a day, 5 days a week). This will help you control your weight and prevent disease.  Limit alcohol to one drink per day.  No smoking.   Wear sunscreen to prevent skin cancer.  See your dentist every six months for an exam and cleaning.

## 2023-06-30 ENCOUNTER — TELEPHONE (OUTPATIENT)
Dept: FAMILY MEDICINE | Facility: CLINIC | Age: 43
End: 2023-06-30
Payer: COMMERCIAL

## 2023-06-30 DIAGNOSIS — J32.9 CHRONIC SINUSITIS, UNSPECIFIED LOCATION: Primary | ICD-10-CM

## 2023-07-14 DIAGNOSIS — Z30.09 COUNSELING FOR BIRTH CONTROL REGARDING INTRAUTERINE DEVICE (IUD): Primary | ICD-10-CM

## 2023-08-09 ENCOUNTER — MYC MEDICAL ADVICE (OUTPATIENT)
Dept: NEUROLOGY | Facility: CLINIC | Age: 43
End: 2023-08-09
Payer: COMMERCIAL

## 2023-08-09 DIAGNOSIS — G43.009 MIGRAINE WITHOUT AURA AND WITHOUT STATUS MIGRAINOSUS, NOT INTRACTABLE: Primary | ICD-10-CM

## 2023-08-09 RX ORDER — AMITRIPTYLINE HYDROCHLORIDE 10 MG/1
10 TABLET ORAL AT BEDTIME
Qty: 30 TABLET | Refills: 3 | Status: SHIPPED | OUTPATIENT
Start: 2023-08-09 | End: 2024-01-02

## 2023-09-07 ENCOUNTER — OFFICE VISIT (OUTPATIENT)
Dept: OBGYN | Facility: CLINIC | Age: 43
End: 2023-09-07
Payer: COMMERCIAL

## 2023-09-07 VITALS
HEART RATE: 86 BPM | OXYGEN SATURATION: 100 % | WEIGHT: 141 LBS | BODY MASS INDEX: 26.21 KG/M2 | SYSTOLIC BLOOD PRESSURE: 122 MMHG | DIASTOLIC BLOOD PRESSURE: 84 MMHG

## 2023-09-07 DIAGNOSIS — Z30.430 ENCOUNTER FOR INSERTION OF INTRAUTERINE CONTRACEPTIVE DEVICE: ICD-10-CM

## 2023-09-07 DIAGNOSIS — Z30.430 ENCOUNTER FOR IUD INSERTION: Primary | ICD-10-CM

## 2023-09-07 LAB — HCG UR QL: NEGATIVE

## 2023-09-07 PROCEDURE — 81025 URINE PREGNANCY TEST: CPT | Performed by: OBSTETRICS & GYNECOLOGY

## 2023-09-07 PROCEDURE — 58300 INSERT INTRAUTERINE DEVICE: CPT | Performed by: OBSTETRICS & GYNECOLOGY

## 2023-09-07 NOTE — PROGRESS NOTES
IUD Insertion:  CONSULT:    Is a pregnancy test required: Yes.  Was it positive or negative?  Negative  Was a consent obtained?  Yes    Subjective: Kinsey Delgado is a 43 year old  presents for IUD and desires Mirena type IUD.    Patient has been given the opportunity to ask questions about all forms of birth control, including all options appropriate for Kinsey Delgado. Discussed that no method of birth control, except abstinence is 100% effective against pregnancy or sexually transmitted infection.     Kinsey Delgado understands she may have the IUD removed at any time. IUD should be removed by a health care provider.    The entire insertion procedure was reviewed with the patient, including care after placement.    Patient's last menstrual period was 2023. Last sexual activity: >2 weeks ago . No allergy to betadine or shellfish.   hCG Urine Qualitative   Date Value Ref Range Status   2023 Negative Negative Final     Comment:     This test is for screening purposes.  Results should be interpreted along with the clinical picture.  Confirmation testing is available if warranted by ordering [id number], HCG Quantitative Pregnancy.         /84   Pulse 86   Wt 64 kg (141 lb)   LMP 2023   SpO2 100%   BMI 26.21 kg/m      Pelvic Exam:   EG/BUS: normal genital architecture without lesions, erythema or abnormal secretions.   Vagina: moist, pink, rugae with physiologic discharge and secretions  Cervix: multiparous no lesions and pink, moist, closed, without lesion or CMT      PROCEDURE NOTE: -- IUD Insertion    Reason for Insertion: contraception and abnormal uterine bleeding      Under sterile technique, cervix was visualized with speculum and prepped with Betadine solution swab x 3. Tenaculum was placed for stability. The uterus was gently straightened and sounded to 8.0 cm. IUD prepared for placement, and IUD inserted according to 's instructions without difficulty or significant  resitance, and deployed at the fundus. The strings were visualized and trimmed to 2.5 cm from the external os. Tenaculum was removed and hemostasis noted. Speculum removed.  Patient tolerated procedure well.      EBL: minimal    Complications: none    ASSESSMENT:     ICD-10-CM    1. Encounter for IUD insertion  Z30.430 HCG Qual, Urine ([id number])     levonorgestrel (MIRENA) 52 MG (20 mcg/day) IUD 1 each     INSERTION INTRAUTERINE DEVICE      2. Encounter for insertion of intrauterine contraceptive device  Z30.430              PLAN:    Encouraged condom use for prevention of STD. Back up contraception advised for 7 days if progestin method. Advised to call for any fever, for prolonged or severe pain or bleeding, abnormal vaginal discharge, or unable to palpate strings. She was advised to use pain medications (ibuprofen) as needed for mild to moderate pain.  Reviewed anticipated bleeding profile, and FDA for contraception up to 8 years, abnormal uterine bleeding up to 5 years.    Sahra Ambrose MD

## 2023-09-24 ENCOUNTER — MYC MEDICAL ADVICE (OUTPATIENT)
Dept: FAMILY MEDICINE | Facility: CLINIC | Age: 43
End: 2023-09-24
Payer: COMMERCIAL

## 2023-09-24 DIAGNOSIS — G43.009 MIGRAINE WITHOUT AURA AND WITHOUT STATUS MIGRAINOSUS, NOT INTRACTABLE: ICD-10-CM

## 2023-09-26 RX ORDER — SUMATRIPTAN 100 MG/1
100 TABLET, FILM COATED ORAL
Qty: 18 TABLET | Refills: 3 | Status: SHIPPED | OUTPATIENT
Start: 2023-09-26 | End: 2024-01-25

## 2023-12-22 ENCOUNTER — MYC MEDICAL ADVICE (OUTPATIENT)
Dept: FAMILY MEDICINE | Facility: CLINIC | Age: 43
End: 2023-12-22
Payer: COMMERCIAL

## 2023-12-22 ENCOUNTER — MYC REFILL (OUTPATIENT)
Dept: NEUROLOGY | Facility: CLINIC | Age: 43
End: 2023-12-22
Payer: COMMERCIAL

## 2023-12-22 DIAGNOSIS — G43.009 MIGRAINE WITHOUT AURA AND WITHOUT STATUS MIGRAINOSUS, NOT INTRACTABLE: ICD-10-CM

## 2023-12-22 RX ORDER — AMITRIPTYLINE HYDROCHLORIDE 10 MG/1
10 TABLET ORAL AT BEDTIME
Qty: 30 TABLET | Refills: 3 | OUTPATIENT
Start: 2023-12-22

## 2023-12-22 NOTE — TELEPHONE ENCOUNTER
RX Authorization    Medication: amitriptyline (ELAVIL) 10 MG tablet     Date last refill ordered:8/9/23    Quantity ordered:30    # refills:0    Date of last clinic visit with ordering provider:8/24/22    Date of next clinic visit with ordering provider:No future appointment scheduled    All pertinent protocol data (lab date/result):    Include pertinent information from patients message:

## 2023-12-26 NOTE — TELEPHONE ENCOUNTER
Responded to the patient through MyChart.     Jovanna Nye RN  [facility]

## 2024-01-02 DIAGNOSIS — G43.009 MIGRAINE WITHOUT AURA AND WITHOUT STATUS MIGRAINOSUS, NOT INTRACTABLE: ICD-10-CM

## 2024-01-02 RX ORDER — AMITRIPTYLINE HYDROCHLORIDE 10 MG/1
10 TABLET ORAL AT BEDTIME
Qty: 30 TABLET | Refills: 3 | Status: SHIPPED | OUTPATIENT
Start: 2024-01-02 | End: 2024-02-09

## 2024-01-02 NOTE — TELEPHONE ENCOUNTER
RX Authorization    Medication: amitriptyline (ELAVIL) 10 MG tablet     Date last refill ordered: 8/9/23    Quantity ordered:30    # refills:0    Date of last clinic visit with ordering provider: 8/24/22    Date of next clinic visit with ordering provider: 2/9/24    All pertinent protocol data (lab date/result):    Include pertinent information from patients message:

## 2024-01-09 ENCOUNTER — TRANSFERRED RECORDS (OUTPATIENT)
Dept: HEALTH INFORMATION MANAGEMENT | Facility: CLINIC | Age: 44
End: 2024-01-09
Payer: COMMERCIAL

## 2024-01-25 DIAGNOSIS — G43.009 MIGRAINE WITHOUT AURA AND WITHOUT STATUS MIGRAINOSUS, NOT INTRACTABLE: ICD-10-CM

## 2024-01-25 RX ORDER — SUMATRIPTAN 100 MG/1
TABLET, FILM COATED ORAL
Qty: 18 TABLET | Refills: 0 | Status: SHIPPED | OUTPATIENT
Start: 2024-01-25 | End: 2024-02-09 | Stop reason: SINTOL

## 2024-02-09 ENCOUNTER — OFFICE VISIT (OUTPATIENT)
Dept: NEUROLOGY | Facility: CLINIC | Age: 44
End: 2024-02-09
Payer: COMMERCIAL

## 2024-02-09 VITALS
HEART RATE: 70 BPM | HEIGHT: 61 IN | BODY MASS INDEX: 26.62 KG/M2 | SYSTOLIC BLOOD PRESSURE: 138 MMHG | DIASTOLIC BLOOD PRESSURE: 79 MMHG | RESPIRATION RATE: 16 BRPM | OXYGEN SATURATION: 99 % | WEIGHT: 141 LBS

## 2024-02-09 DIAGNOSIS — G43.009 MIGRAINE WITHOUT AURA AND WITHOUT STATUS MIGRAINOSUS, NOT INTRACTABLE: Primary | ICD-10-CM

## 2024-02-09 PROCEDURE — 99214 OFFICE O/P EST MOD 30 MIN: CPT | Performed by: PSYCHIATRY & NEUROLOGY

## 2024-02-09 RX ORDER — AMITRIPTYLINE HYDROCHLORIDE 10 MG/1
10 TABLET ORAL AT BEDTIME
Qty: 90 TABLET | Refills: 3 | Status: SHIPPED | OUTPATIENT
Start: 2024-02-09 | End: 2024-05-21

## 2024-02-09 RX ORDER — NARATRIPTAN 2.5 MG/1
2.5 TABLET ORAL
Qty: 9 TABLET | Refills: 11 | Status: SHIPPED | OUTPATIENT
Start: 2024-02-09 | End: 2024-05-21

## 2024-02-09 ASSESSMENT — PAIN SCALES - GENERAL: PAINLEVEL: NO PAIN (0)

## 2024-02-09 NOTE — NURSING NOTE
Chief Complaint   Patient presents with    RECHECK     Here for follow up, confirmed with patient     Martha Luna

## 2024-02-09 NOTE — PROGRESS NOTES
Neurology Progress Note    [facility]    Kinsey Delgado MRN# [id number]   Age: 43 year old YOB: 1980     PCP: Leyla Garay            Assessment and Plan:     (G43.009) Migraine without aura and without status migrainosus, not intractable  (primary encounter diagnosis)  Comment: The patient has episodic migraine without aura that is under good control with the use of amitriptyline as a preventative medicine.  We will change her acute rescue medicine to naratriptan which has a longer action as well as a mild onset of side effects.  If that does not helpful enough for her compared to sumatriptan we could try something in the middle with Eletriptan 40 mg tablets.    At this point time I do not particularly think she needs to be seen in neurology for her migraine management if her primary care physician would be agreeable to take over the prescriptions.  I have sent a message to Dr. Garay requesting a transfer back.    Plan:   Preventive: amitriptyline (ELAVIL) 10 MG tablet  Acute: naratriptan (AMERGE) 2.5 MG tablet prn  30 minutes spent caring for the patient on the day of the visit, that includes chart review, visit, time, order placement and contacting her PCP.           Sahra Fish MD             History of Present Illness:   CC: Sonam Euceda is a 43 year old woman who was seen once before in August 2022.  At that time she had episodic migraine headaches were causing life disruption.  She was started on amitriptyline 10 mg p.o. nightly for prevention of migraines and continue to use sumatriptan as her rescue.  She reports overall she has been very happy with the management of her migraines since this initiation.  She denies any side effects from the amitriptyline and finds it is effective both in helping her headaches as well as helping her sleep quality.  She does note that she has some trepidation about using sumatriptan because of the side effects and sometimes that leads to delay  "in treatment.    Current headache symptoms:  Frequency: at least once a month probably about 4-5 a month of migraines.   Quality: throbbing  Location: pain in neck muscle is first sign then the pain travels up the head on right side  Duration: 3 days  Associated symptoms: fatigue, nausea (mild), photophobia and phonophobia  Awakens from sleep due to sx's:  Yes  Precipitating Injury:  No    Triggers: Hormonal cycle, worse in fall, worse with stress     Previous Treatment Trials:  Acute therapies:  Butalbital/APAP used in past not effective  Sumatriptan 100 mg tablets side effects.      Chronic therapies:  Magnesium-not sure if it helps, causes GI upset taking Magnesium Oxide  Amitriptyline 10 mg at bedtime helped, no side effects.    Did some PT and wears a mouthguard. Works on posture.          Physical Exam:     /79 (BP Location: Right arm, Patient Position: Sitting, Cuff Size: Adult Regular)   Pulse 70   Resp 16   Ht 1.549 m (5' 1\")   Wt 64 kg (141 lb)   SpO2 99%   BMI 26.64 kg/m      Awake and alert.  Oriented.  No aphasia or dysarthria.  Cranial nerves II through XII intact.  No tremor.  Coordination normal.  Gait examination stable         Pertinent History/Data for appointment:     No major changes in health history.  The patient notes that she is using a Mirena for birth control no plans to get pregnant moving forward.  "

## 2024-02-09 NOTE — LETTER
2/9/2024       RE: Kinsey Delgado  [address] [place] [address]     Dear Colleague,    Thank you for referring your patient, Kinsey Delgado, to the [facility] [place] [facility]. Please see a copy of my visit note below.    Neurology Progress Note    [facility]    Kinsey Delgado MRN# [id number]   Age: 43 year old YOB: 1980     PCP: Leyla Garay            Assessment and Plan:     (G43.009) Migraine without aura and without status migrainosus, not intractable  (primary encounter diagnosis)  Comment: The patient has episodic migraine without aura that is under good control with the use of amitriptyline as a preventative medicine.  We will change her acute rescue medicine to naratriptan which has a longer action as well as a mild onset of side effects.  If that does not helpful enough for her compared to sumatriptan we could try something in the middle with Eletriptan 40 mg tablets.    At this point time I do not particularly think she needs to be seen in neurology for her migraine management if her primary care physician would be agreeable to take over the prescriptions.  I have sent a message to Dr. Garay requesting a transfer back.    Plan:   Preventive: amitriptyline (ELAVIL) 10 MG tablet  Acute: naratriptan (AMERGE) 2.5 MG tablet prn  30 minutes spent caring for the patient on the day of the visit, that includes chart review, visit, time, order placement and contacting her PCP.           Sahra Fish MD             History of Present Illness:   CC: Sonam Euceda is a 43 year old woman who was seen once before in August 2022.  At that time she had episodic migraine headaches were causing life disruption.  She was started on amitriptyline 10 mg p.o. nightly for prevention of migraines and continue to use sumatriptan as her rescue.  She reports overall she has been very happy with the management of  "her migraines since this initiation.  She denies any side effects from the amitriptyline and finds it is effective both in helping her headaches as well as helping her sleep quality.  She does note that she has some trepidation about using sumatriptan because of the side effects and sometimes that leads to delay in treatment.    Current headache symptoms:  Frequency: at least once a month probably about 4-5 a month of migraines.   Quality: throbbing  Location: pain in neck muscle is first sign then the pain travels up the head on right side  Duration: 3 days  Associated symptoms: fatigue, nausea (mild), photophobia and phonophobia  Awakens from sleep due to sx's:  Yes  Precipitating Injury:  No    Triggers: Hormonal cycle, worse in fall, worse with stress     Previous Treatment Trials:  Acute therapies:  Butalbital/APAP used in past not effective  Sumatriptan 100 mg tablets side effects.      Chronic therapies:  Magnesium-not sure if it helps, causes GI upset taking Magnesium Oxide  Amitriptyline 10 mg at bedtime helped, no side effects.    Did some PT and wears a mouthguard. Works on posture.          Physical Exam:     /79 (BP Location: Right arm, Patient Position: Sitting, Cuff Size: Adult Regular)   Pulse 70   Resp 16   Ht 1.549 m (5' 1\")   Wt 64 kg (141 lb)   SpO2 99%   BMI 26.64 kg/m      Awake and alert.  Oriented.  No aphasia or dysarthria.  Cranial nerves II through XII intact.  No tremor.  Coordination normal.  Gait examination stable         Pertinent History/Data for appointment:     No major changes in health history.  The patient notes that she is using a Mirena for birth control no plans to get pregnant moving forward.      Again, thank you for allowing me to participate in the care of your patient.      Sincerely,    Sahra Fish MD    "

## 2024-02-09 NOTE — PATIENT INSTRUCTIONS
Keep headache journal of your symptoms. Try to track how often you have a headache, how long it lasts and what medicines you used. You can also track severity of headache    You can use smart phone apps: my migraine larisa or use paper.     When you use a triptan medicine (sumatriptan, rizatriptan, frovatriptan, zolmitriptan, eletriptan, naratriptan, almotriptan etc) take as soon as you notice the migraine begins. You can take a second dose 2-4 hours after the first dose if you still have any migraine symptoms    It is fine to take 600 mg of ibuprofen (Advil) or 440 mg of naproxen (Aleve) with the triptan medicine    Try not to use triptan medicines more than 2 days a week

## 2024-03-17 ENCOUNTER — OFFICE VISIT (OUTPATIENT)
Dept: URGENT CARE | Facility: URGENT CARE | Age: 44
End: 2024-03-17
Payer: COMMERCIAL

## 2024-03-17 VITALS
HEART RATE: 78 BPM | SYSTOLIC BLOOD PRESSURE: 118 MMHG | DIASTOLIC BLOOD PRESSURE: 78 MMHG | OXYGEN SATURATION: 99 % | TEMPERATURE: 97.8 F | BODY MASS INDEX: 27.38 KG/M2 | HEIGHT: 61 IN | WEIGHT: 145 LBS

## 2024-03-17 DIAGNOSIS — J02.0 STREP THROAT: Primary | ICD-10-CM

## 2024-03-17 DIAGNOSIS — R07.0 THROAT PAIN: ICD-10-CM

## 2024-03-17 LAB — DEPRECATED S PYO AG THROAT QL EIA: POSITIVE

## 2024-03-17 PROCEDURE — 87880 STREP A ASSAY W/OPTIC: CPT

## 2024-03-17 PROCEDURE — 99213 OFFICE O/P EST LOW 20 MIN: CPT | Performed by: NURSE PRACTITIONER

## 2024-03-17 RX ORDER — AMOXICILLIN 500 MG/1
500 CAPSULE ORAL 2 TIMES DAILY
Qty: 20 CAPSULE | Refills: 0 | Status: SHIPPED | OUTPATIENT
Start: 2024-03-17 | End: 2024-03-27

## 2024-05-20 ENCOUNTER — MYC MEDICAL ADVICE (OUTPATIENT)
Dept: FAMILY MEDICINE | Facility: CLINIC | Age: 44
End: 2024-05-20
Payer: COMMERCIAL

## 2024-05-20 DIAGNOSIS — G43.009 MIGRAINE WITHOUT AURA AND WITHOUT STATUS MIGRAINOSUS, NOT INTRACTABLE: ICD-10-CM

## 2024-05-21 RX ORDER — NARATRIPTAN 2.5 MG/1
2.5 TABLET ORAL
Qty: 9 TABLET | Refills: 11 | Status: SHIPPED | OUTPATIENT
Start: 2024-05-21

## 2024-05-21 RX ORDER — AMITRIPTYLINE HYDROCHLORIDE 10 MG/1
10 TABLET ORAL AT BEDTIME
Qty: 90 TABLET | Refills: 3 | Status: SHIPPED | OUTPATIENT
Start: 2024-05-21

## 2024-05-21 NOTE — TELEPHONE ENCOUNTER
-- Patient would like you to take over her Amitriptyline and Naratriptan prescriptions. She does have refills on file already but wishes for you to take over.    Jovanna Nye RN  [facility]

## 2024-07-25 ENCOUNTER — OFFICE VISIT (OUTPATIENT)
Dept: FAMILY MEDICINE | Facility: CLINIC | Age: 44
End: 2024-07-25
Payer: COMMERCIAL

## 2024-07-25 VITALS
TEMPERATURE: 97.3 F | HEART RATE: 67 BPM | WEIGHT: 143 LBS | HEIGHT: 61 IN | BODY MASS INDEX: 27 KG/M2 | SYSTOLIC BLOOD PRESSURE: 137 MMHG | DIASTOLIC BLOOD PRESSURE: 87 MMHG | OXYGEN SATURATION: 99 % | RESPIRATION RATE: 18 BRPM

## 2024-07-25 DIAGNOSIS — Z00.00 ROUTINE GENERAL MEDICAL EXAMINATION AT A HEALTH CARE FACILITY: Primary | ICD-10-CM

## 2024-07-25 DIAGNOSIS — Z13.6 CARDIOVASCULAR SCREENING; LDL GOAL LESS THAN 160: ICD-10-CM

## 2024-07-25 DIAGNOSIS — R03.0 ELEVATED BLOOD PRESSURE READING WITHOUT DIAGNOSIS OF HYPERTENSION: ICD-10-CM

## 2024-07-25 DIAGNOSIS — Z12.4 CERVICAL CANCER SCREENING: ICD-10-CM

## 2024-07-25 LAB
ANION GAP SERPL CALCULATED.3IONS-SCNC: 9 MMOL/L (ref 7–15)
BUN SERPL-MCNC: 12.2 MG/DL (ref 6–20)
CALCIUM SERPL-MCNC: 9.1 MG/DL (ref 8.8–10.4)
CHLORIDE SERPL-SCNC: 103 MMOL/L (ref 98–107)
CHOLEST SERPL-MCNC: 209 MG/DL
CREAT SERPL-MCNC: 0.67 MG/DL (ref 0.51–0.95)
EGFRCR SERPLBLD CKD-EPI 2021: >90 ML/MIN/1.73M2
FASTING STATUS PATIENT QL REPORTED: NO
FASTING STATUS PATIENT QL REPORTED: NO
GLUCOSE SERPL-MCNC: 96 MG/DL (ref 70–99)
HCO3 SERPL-SCNC: 26 MMOL/L (ref 22–29)
HDLC SERPL-MCNC: 84 MG/DL
LDLC SERPL CALC-MCNC: 99 MG/DL
NONHDLC SERPL-MCNC: 125 MG/DL
POTASSIUM SERPL-SCNC: 4.1 MMOL/L (ref 3.4–5.3)
SODIUM SERPL-SCNC: 138 MMOL/L (ref 135–145)
TRIGL SERPL-MCNC: 131 MG/DL

## 2024-07-25 PROCEDURE — 91320 SARSCV2 VAC 30MCG TRS-SUC IM: CPT | Performed by: FAMILY MEDICINE

## 2024-07-25 PROCEDURE — 87624 HPV HI-RISK TYP POOLED RSLT: CPT | Performed by: FAMILY MEDICINE

## 2024-07-25 PROCEDURE — 99396 PREV VISIT EST AGE 40-64: CPT | Performed by: FAMILY MEDICINE

## 2024-07-25 PROCEDURE — 80048 BASIC METABOLIC PNL TOTAL CA: CPT | Performed by: FAMILY MEDICINE

## 2024-07-25 PROCEDURE — G0145 SCR C/V CYTO,THINLAYER,RESCR: HCPCS | Performed by: FAMILY MEDICINE

## 2024-07-25 PROCEDURE — 90480 ADMN SARSCOV2 VAC 1/ONLY CMP: CPT | Performed by: FAMILY MEDICINE

## 2024-07-25 PROCEDURE — 36415 COLL VENOUS BLD VENIPUNCTURE: CPT | Performed by: FAMILY MEDICINE

## 2024-07-25 PROCEDURE — 80061 LIPID PANEL: CPT | Performed by: FAMILY MEDICINE

## 2024-07-25 RX ORDER — AMITRIPTYLINE HYDROCHLORIDE 50 MG/1
50 TABLET ORAL AT BEDTIME
Qty: 90 TABLET | Refills: 1 | Status: SHIPPED | OUTPATIENT
Start: 2024-07-25

## 2024-07-25 SDOH — HEALTH STABILITY: PHYSICAL HEALTH: ON AVERAGE, HOW MANY DAYS PER WEEK DO YOU ENGAGE IN MODERATE TO STRENUOUS EXERCISE (LIKE A BRISK WALK)?: 5 DAYS

## 2024-07-25 ASSESSMENT — SOCIAL DETERMINANTS OF HEALTH (SDOH): HOW OFTEN DO YOU GET TOGETHER WITH FRIENDS OR RELATIVES?: ONCE A WEEK

## 2024-07-25 ASSESSMENT — PAIN SCALES - GENERAL: PAINLEVEL: NO PAIN (0)

## 2024-07-25 NOTE — PROGRESS NOTES
"Preventive Care Visit  [facility]  Leyla Garay MD, Family Medicine  Jul 25, 2024      Assessment & Plan     (Z00.00) Routine general medical examination at a health care facility  (primary encounter diagnosis)  (Z12.4) Cervical cancer screening  Plan: Pap Screen with HPV - Recommended Age 30 - 65         Years          (R03.0) Elevated blood pressure reading without diagnosis of hypertension  Comment:   BP Readings from Last 3 Encounters:   07/25/24 137/87   03/17/24 118/78   02/09/24 138/79      Plan: BASIC METABOLIC PANEL, amitriptyline (ELAVIL)         50 MG tablet          (Z13.6) CARDIOVASCULAR SCREENING; LDL GOAL LESS THAN 160  Plan: Lipid panel reflex to direct LDL Non-fasting          Patient has been advised of split billing requirements and indicates understanding: Yes    BMI  Estimated body mass index is 26.65 kg/m  as calculated from the following:    Height as of this encounter: 1.56 m (5' 1.42\").    Weight as of this encounter: 64.9 kg (143 lb).   Weight management plan: see AVS for lifestyle changes    Counseling  Appropriate preventive services were addressed with this patient via screening, questionnaire, or discussion as appropriate for fall prevention, nutrition, physical activity, Tobacco-use cessation, weight loss and cognition.  Checklist reviewing preventive services available has been given to the patient.  Reviewed patient's diet, addressing concerns and/or questions.     Corinne Euceda is a 44 year old, presenting for the following:  Physical and Gyn Exam        7/25/2024     2:48 PM   Additional Questions   Roomed by Nohemi PAYNE   Accompanied by self        Health Care Directive  Patient does not have a Health Care Directive or Living Will: Discussed advance care planning with patient; information given to patient to review.    HPI  Perimenopausal  She's had an IUD which has helped with menstrual symptoms.  She has persistent irritability \"just like " "snap\", weight gain, insomnia.  She is currently on sabbatical. She's exercising regularly, runs 4-5 times per week and is putting in a patio at her house. She's having more GI symptoms which are annoying. She's trying to figure out if she's sensitive to gluten or dairy, no patterns noted. She's cut back on caffeine.  She feels that she's short of sleep, using watch to try to monitor overnight sleep.  She falls asleep well. She's taking amitriptyline for migraine prophylaxis.     She was pursuing IVF several years ago, 4 rounds, stopped 2 years ago.        7/25/2024   General Health   How would you rate your overall physical health? Good   Feel stress (tense, anxious, or unable to sleep) To some extent      (!) STRESS CONCERN      7/25/2024   Nutrition   Three or more servings of calcium each day? Yes   Diet: Regular (no restrictions)   How many servings of fruit and vegetables per day? (!) 2-3   How many sweetened beverages each day? 0-1            7/25/2024   Exercise   Days per week of moderate/strenous exercise 5 days            7/25/2024   Social Factors   Frequency of gathering with friends or relatives Once a week   Worry food won't last until get money to buy more No   Food not last or not have enough money for food? No   Do you have housing? (Housing is defined as stable permanent housing and does not include staying ouside in a car, in a tent, in an abandoned building, in an overnight shelter, or couch-surfing.) Yes   Are you worried about losing your housing? No   Lack of transportation? No   Unable to get utilities (heat,electricity)? No            7/25/2024   Dental   Dentist two times every year? Yes            7/25/2024   TB Screening   Were you born outside of the [place]? No                7/25/2024   Substance Use   Alcohol more than 3/day or more than 7/wk No   Do you use any other substances recreationally? (!) CANNABIS PRODUCTS        Social History     Tobacco Use    Smoking status: Former     " Current packs/day: 0.00     Average packs/day: 0.5 packs/day for 10.0 years (5.0 ttl pk-yrs)     Types: Cigarettes     Start date: 2000     Quit date: 2009     Years since quitting: 15.1    Smokeless tobacco: Never   Vaping Use    Vaping status: Never Used   Substance Use Topics    Alcohol use: Not Currently     Comment: social     Drug use: Never           2023   LAST FHS-7 RESULTS   1st degree relative breast or ovarian cancer No   Any relative bilateral breast cancer No   Any male have breast cancer No   Any ONE woman have BOTH breast AND ovarian cancer Yes   Any woman with breast cancer before 50yrs No   2 or more relatives with breast AND/OR ovarian cancer No   2 or more relatives with breast AND/OR bowel cancer No           Mammogram Screening - Mammogram every 1-2 years updated in Health Maintenance based on mutual decision making        2024   STI Screening   New sexual partner(s) since last STI/HIV test? No        History of abnormal Pap smear: No - age 30-64 HPV with reflex Pap every 5 years recommended        2019     3:00 PM   PAP / HPV   PAP-ABSTRACT See Scanned Document           This result is from an external source.     ASCVD Risk   The ASCVD Risk score (Sin DK, et al., 2019) failed to calculate for the following reasons:    Cannot find a previous HDL lab    Cannot find a previous total cholesterol lab        2024   Contraception/Family Planning   Questions about contraception or family planning No           Reviewed and updated as needed this visit by Provider   Tobacco   Meds  Problems  Med Hx  Surg Hx  Fam Hx            Past Medical History:   Diagnosis Date    Female infertility     Migraine      Past Surgical History:   Procedure Laterality Date    DILATION AND CURETTAGE SUCTION N/A 2022    Procedure: DILATION AND CURETTAGE, UTERUS, USING SUCTION;  Surgeon: Tanvi Sandoval MD;  Location: [facility] OR     OB History    Para Term  AB  "Living   2 1 1 0 0 1   SAB IAB Ectopic Multiple Live Births   0 0 0 0 1      # Outcome Date GA Lbr Noah/2nd Weight Sex Type Anes PTL Lv   2             1 Term 19 41w0d  3.137 kg (6 lb 14.7 oz) F Vag-Spont EPI N JOLEEN      Name: IRVING DURANT      Apgar1: 8  Apgar5: 9     Review of Systems  Constitutional, HEENT, cardiovascular, pulmonary, gi and gu systems are negative, except as otherwise noted.     Objective    Exam  /87 (BP Location: Right arm, Patient Position: Sitting, Cuff Size: Adult Regular)   Pulse 67   Temp 97.3  F (36.3  C) (Temporal)   Resp 18   Ht 1.56 m (5' 1.42\")   Wt 64.9 kg (143 lb)   LMP 2024 (Approximate)   SpO2 99%   BMI 26.65 kg/m     Estimated body mass index is 26.65 kg/m  as calculated from the following:    Height as of this encounter: 1.56 m (5' 1.42\").    Weight as of this encounter: 64.9 kg (143 lb).    Physical Exam  GENERAL: alert and no distress  EYES: Eyes grossly normal to inspection, PERRL and conjunctivae and sclerae normal  HENT: ear canals and TM's normal, nose and mouth without ulcers or lesions  NECK: no adenopathy, no asymmetry, masses, or scars  RESP: lungs clear to auscultation - no rales, rhonchi or wheezes  CV: regular rate and rhythm, normal S1 S2, no S3 or S4, no murmur, click or rub, no peripheral edema  ABDOMEN: soft, nontender, no hepatosplenomegaly, no masses and bowel sounds normal   (female): normal female external genitalia, normal urethral meatus, normal vaginal mucosa, IUD strings visualized protruding 1 cm from cervical os.  MS: no gross musculoskeletal defects noted, no edema  SKIN: no suspicious lesions or rashes  NEURO: Normal strength and tone, mentation intact and speech normal  PSYCH: mentation appears normal, affect normal/bright        Signed Electronically by: Leyla Garay MD    "

## 2024-07-25 NOTE — PATIENT INSTRUCTIONS
Patient Education   Preventive Care Advice   This is general advice given by our system to help you stay healthy. However, your care team may have specific advice just for you. Please talk to your care team about your preventive care needs.  Nutrition  Eat 5 or more servings of fruits and vegetables each day.  Try wheat bread, brown rice and whole grain pasta (instead of white bread, rice, and pasta).  Get enough calcium and vitamin D. Check the label on foods and aim for 100% of the RDA (recommended daily allowance).  Lifestyle  Exercise at least 150 minutes each week  (30 minutes a day, 5 days a week).  Do muscle strengthening activities 2 days a week. These help control your weight and prevent disease.  No smoking.  Wear sunscreen to prevent skin cancer.  Have a dental exam and cleaning every 6 months.  Yearly exams  See your health care team every year to talk about:  Any changes in your health.  Any medicines your care team has prescribed.  Preventive care, family planning, and ways to prevent chronic diseases.  Shots (vaccines)   HPV shots (up to age 26), if you've never had them before.  Hepatitis B shots (up to age 59), if you've never had them before.  COVID-19 shot: Get this shot when it's due.  Flu shot: Get a flu shot every year.  Tetanus shot: Get a tetanus shot every 10 years.  Pneumococcal, hepatitis A, and RSV shots: Ask your care team if you need these based on your risk.  Shingles shot (for age 50 and up)  General health tests  Diabetes screening:  Starting at age 35, Get screened for diabetes at least every 3 years.  If you are younger than age 35, ask your care team if you should be screened for diabetes.  Cholesterol test: At age 39, start having a cholesterol test every 5 years, or more often if advised.  Bone density scan (DEXA): At age 50, ask your care team if you should have this scan for osteoporosis (brittle bones).  Hepatitis C: Get tested at least once in your life.  STIs (sexually  transmitted infections)  Before age 24: Ask your care team if you should be screened for STIs.  After age 24: Get screened for STIs if you're at risk. You are at risk for STIs (including HIV) if:  You are sexually active with more than one person.  You don't use condoms every time.  You or a partner was diagnosed with a sexually transmitted infection.  If you are at risk for HIV, ask about PrEP medicine to prevent HIV.  Get tested for HIV at least once in your life, whether you are at risk for HIV or not.  Cancer screening tests  Cervical cancer screening: If you have a cervix, begin getting regular cervical cancer screening tests starting at age 21.  Breast cancer scan (mammogram): If you've ever had breasts, begin having regular mammograms starting at age 40. This is a scan to check for breast cancer.  Colon cancer screening: It is important to start screening for colon cancer at age 45.  Have a colonoscopy test every 10 years (or more often if you're at risk) Or, ask your provider about stool tests like a FIT test every year or Cologuard test every 3 years.  To learn more about your testing options, visit:   .  For help making a decision, visit:   https://bit.ly/df94202.  Prostate cancer screening test: If you have a prostate, ask your care team if a prostate cancer screening test (PSA) at age 55 is right for you.  Lung cancer screening: If you are a current or former smoker ages 50 to 80, ask your care team if ongoing lung cancer screenings are right for you.  For informational purposes only. Not to replace the advice of your health care provider. Copyright   2023 [facility]. All rights reserved. Clinically reviewed by the [facility]. [employer] 988237 - REV 01/24.  Substance Use Disorder: Care Instructions  Overview     You can improve your life and health by stopping your use of alcohol or drugs. When you don't drink or use drugs, you may feel and sleep better. You may  get along better with your family, friends, and coworkers. There are medicines and programs that can help with substance use disorder.  How can you care for yourself at home?  Here are some ways to help you stay sober and prevent relapse.  If you have been given medicine to help keep you sober or reduce your cravings, be sure to take it exactly as prescribed.  Talk to your doctor about programs that can help you stop using drugs or drinking alcohol.  Do not keep alcohol or drugs in your home.  Plan ahead. Think about what you'll say if other people ask you to drink or use drugs. Try not to spend time with people who drink or use drugs.  Use the time and money spent on drinking or drugs to do something that's important to you.  Preventing a relapse  Have a plan to deal with relapse. Learn to recognize changes in your thinking that lead you to drink or use drugs. Get help before you start to drink or use drugs again.  Try to stay away from situations, friends, or places that may lead you to drink or use drugs.  If you feel the need to drink alcohol or use drugs again, seek help right away. Call a trusted friend or family member. Some people get support from organizations such as Narcotics Anonymous or [employer] or from treatment facilities.  If you relapse, get help as soon as you can. Some people make a plan with another person that outlines what they want that person to do for them if they relapse. The plan usually includes how to handle the relapse and who to notify in case of relapse.  Don't give up. Remember that a relapse doesn't mean that you have failed. Use the experience to learn the triggers that lead you to drink or use drugs. Then quit again. Recovery is a lifelong process. Many people have several relapses before they are able to quit for good.  Follow-up care is a key part of your treatment and safety. Be sure to make and go to all appointments, and call your doctor if you are having problems. It's  "also a good idea to know your test results and keep a list of the medicines you take.  When should you call for help?   Call 911  anytime you think you may need emergency care. For example, call if you or someone else:    Has overdosed or has withdrawal signs. Be sure to tell the emergency workers that you are or someone else is using or trying to quit using drugs. Overdose or withdrawal signs may include:  Losing consciousness.  Seizure.  Seeing or hearing things that aren't there (hallucinations).     Is thinking or talking about suicide or harming others.   Where to get help 24 hours a day, 7 days a week   If you or someone you know talks about suicide, self-harm, a mental health crisis, a substance use crisis, or any other kind of emotional distress, get help right away. You can:    Call the Suicide and Crisis Lifeline at 988.     Call 5-147-029-TALK (1-388.969.6200).     Text HOME to 386100 to access the Crisis Text Line.   Consider saving these numbers in your phone.  Go to [employer]wst.cn for more information or to chat online.  Call your doctor now or seek immediate medical care if:    You are having withdrawal symptoms. These may include nausea or vomiting, sweating, shakiness, and anxiety.   Watch closely for changes in your health, and be sure to contact your doctor if:    You have a relapse.     You need more help or support to stop.   Where can you learn more?  Go to https://www.Spume[employer].net/patiented  Enter H573 in the search box to learn more about \"Substance Use Disorder: Care Instructions.\"  Current as of: November 15, 2023               Content Version: 14.0    2863-3896 [employer].   Care instructions adapted under license by your healthcare professional. If you have questions about a medical condition or this instruction, always ask your healthcare professional. [employer] disclaims any warranty or liability for your use of this information.         "

## 2024-07-30 NOTE — RESULT ENCOUNTER NOTE
Thank you very much for getting labs done!  Your cholesterol looks good, everything is at goal.  Your glucose level, electrolyte level and kidney function, measured with a test called the basic metabolic panel, is normal, which is great news!      Sincerely,  Dr. Leyla Garay MD  7/30/2024

## 2024-07-31 LAB
BKR LAB AP GYN ADEQUACY: NORMAL
BKR LAB AP GYN INTERPRETATION: NORMAL
BKR LAB AP PREVIOUS ABNORMAL: NORMAL
HPV HR 12 DNA CVX QL NAA+PROBE: NEGATIVE
HPV16 DNA CVX QL NAA+PROBE: NEGATIVE
HPV18 DNA CVX QL NAA+PROBE: NEGATIVE
HUMAN PAPILLOMA VIRUS FINAL DIAGNOSIS: NORMAL
PATH REPORT.COMMENTS IMP SPEC: NORMAL
PATH REPORT.COMMENTS IMP SPEC: NORMAL
PATH REPORT.RELEVANT HX SPEC: NORMAL

## 2024-10-25 ENCOUNTER — MYC MEDICAL ADVICE (OUTPATIENT)
Dept: FAMILY MEDICINE | Facility: CLINIC | Age: 44
End: 2024-10-25
Payer: COMMERCIAL

## 2024-10-25 DIAGNOSIS — Z12.83 SCREENING FOR MALIGNANT NEOPLASM OF SKIN: Primary | ICD-10-CM

## 2024-10-28 NOTE — TELEPHONE ENCOUNTER
Writer responded via [facility].  IBRAHIMA AdairN, RN-BC  [facility]

## 2025-04-07 ENCOUNTER — OFFICE VISIT (OUTPATIENT)
Dept: URGENT CARE | Facility: URGENT CARE | Age: 45
End: 2025-04-07
Payer: COMMERCIAL

## 2025-04-07 ENCOUNTER — ANCILLARY PROCEDURE (OUTPATIENT)
Dept: GENERAL RADIOLOGY | Facility: CLINIC | Age: 45
End: 2025-04-07
Attending: PHYSICIAN ASSISTANT
Payer: COMMERCIAL

## 2025-04-07 VITALS
DIASTOLIC BLOOD PRESSURE: 86 MMHG | TEMPERATURE: 98.5 F | OXYGEN SATURATION: 98 % | BODY MASS INDEX: 27.56 KG/M2 | WEIGHT: 146 LBS | RESPIRATION RATE: 16 BRPM | HEIGHT: 61 IN | HEART RATE: 84 BPM | SYSTOLIC BLOOD PRESSURE: 138 MMHG

## 2025-04-07 DIAGNOSIS — R06.02 SHORTNESS OF BREATH: ICD-10-CM

## 2025-04-07 DIAGNOSIS — R10.9 RIGHT FLANK PAIN: Primary | ICD-10-CM

## 2025-04-07 LAB
ALBUMIN UR-MCNC: NEGATIVE MG/DL
APPEARANCE UR: CLEAR
BASOPHILS # BLD AUTO: 0 10E3/UL (ref 0–0.2)
BASOPHILS NFR BLD AUTO: 0 %
BILIRUB UR QL STRIP: NEGATIVE
COLOR UR AUTO: YELLOW
EOSINOPHIL # BLD AUTO: 0.3 10E3/UL (ref 0–0.7)
EOSINOPHIL NFR BLD AUTO: 2 %
ERYTHROCYTE [DISTWIDTH] IN BLOOD BY AUTOMATED COUNT: 12.9 % (ref 10–15)
GLUCOSE UR STRIP-MCNC: NEGATIVE MG/DL
HCT VFR BLD AUTO: 42.2 % (ref 35–47)
HGB BLD-MCNC: 13.6 G/DL (ref 11.7–15.7)
HGB UR QL STRIP: ABNORMAL
IMM GRANULOCYTES # BLD: 0 10E3/UL
IMM GRANULOCYTES NFR BLD: 0 %
KETONES UR STRIP-MCNC: NEGATIVE MG/DL
LEUKOCYTE ESTERASE UR QL STRIP: NEGATIVE
LYMPHOCYTES # BLD AUTO: 2.3 10E3/UL (ref 0.8–5.3)
LYMPHOCYTES NFR BLD AUTO: 15 %
MCH RBC QN AUTO: 31.1 PG (ref 26.5–33)
MCHC RBC AUTO-ENTMCNC: 32.2 G/DL (ref 31.5–36.5)
MCV RBC AUTO: 97 FL (ref 78–100)
MONOCYTES # BLD AUTO: 1.2 10E3/UL (ref 0–1.3)
MONOCYTES NFR BLD AUTO: 8 %
NEUTROPHILS # BLD AUTO: 11.3 10E3/UL (ref 1.6–8.3)
NEUTROPHILS NFR BLD AUTO: 75 %
NITRATE UR QL: NEGATIVE
PH UR STRIP: 5.5 [PH] (ref 5–7)
PLATELET # BLD AUTO: 307 10E3/UL (ref 150–450)
RBC # BLD AUTO: 4.37 10E6/UL (ref 3.8–5.2)
RBC #/AREA URNS AUTO: NORMAL /HPF
SP GR UR STRIP: 1.01 (ref 1–1.03)
UROBILINOGEN UR STRIP-ACNC: 0.2 E.U./DL
WBC # BLD AUTO: 15.2 10E3/UL (ref 4–11)
WBC # BLD AUTO: 15.2 10E3/UL (ref 4–11)
WBC #/AREA URNS AUTO: NORMAL /HPF

## 2025-04-07 PROCEDURE — 71046 X-RAY EXAM CHEST 2 VIEWS: CPT | Mod: TC | Performed by: RADIOLOGY

## 2025-04-07 PROCEDURE — 3079F DIAST BP 80-89 MM HG: CPT | Performed by: PHYSICIAN ASSISTANT

## 2025-04-07 PROCEDURE — 81001 URINALYSIS AUTO W/SCOPE: CPT | Performed by: PHYSICIAN ASSISTANT

## 2025-04-07 PROCEDURE — 99214 OFFICE O/P EST MOD 30 MIN: CPT | Performed by: PHYSICIAN ASSISTANT

## 2025-04-07 PROCEDURE — 36415 COLL VENOUS BLD VENIPUNCTURE: CPT | Performed by: PHYSICIAN ASSISTANT

## 2025-04-07 PROCEDURE — 85025 COMPLETE CBC W/AUTO DIFF WBC: CPT | Performed by: PHYSICIAN ASSISTANT

## 2025-04-07 PROCEDURE — 93005 ELECTROCARDIOGRAM TRACING: CPT | Performed by: PHYSICIAN ASSISTANT

## 2025-04-07 PROCEDURE — 3075F SYST BP GE 130 - 139MM HG: CPT | Performed by: PHYSICIAN ASSISTANT

## 2025-04-07 PROCEDURE — 74018 RADEX ABDOMEN 1 VIEW: CPT | Mod: TC | Performed by: RADIOLOGY

## 2025-04-07 RX ORDER — POLYETHYLENE GLYCOL 3350 17 G/17G
2 POWDER, FOR SOLUTION ORAL DAILY
Qty: 68 G | Refills: 0 | Status: SHIPPED | OUTPATIENT
Start: 2025-04-07 | End: 2025-04-09

## 2025-04-07 RX ORDER — ALBUTEROL SULFATE 90 UG/1
2 INHALANT RESPIRATORY (INHALATION) EVERY 6 HOURS PRN
Qty: 18 G | Refills: 0 | Status: SHIPPED | OUTPATIENT
Start: 2025-04-07

## 2025-04-07 NOTE — PROGRESS NOTES
Right flank pain  - XR KUB  - XR Chest 2 Views  - UA Macroscopic with reflex to Microscopic and Culture - Clinic Collect  - WBC count  - EKG 12-lead, tracing only  - UA Microscopic with Reflex to Culture  - CBC with platelets and differential  - polyethylene glycol (MIRALAX) 17 GM/Dose powder; Take 34 g (2 Capfuls) by mouth daily for 2 days.    Shortness of breath  - XR Chest 2 Views  - WBC count  - EKG 12-lead, tracing only  - CBC with platelets and differential  - albuterol (PROAIR HFA/PROVENTIL HFA/VENTOLIN HFA) 108 (90 Base) MCG/ACT inhaler; Inhale 2 puffs into the lungs every 6 hours as needed for shortness of breath, wheezing or cough.    Patient's symptoms are puzzling. EKG was normal. WBC count was elevated today as well as her neutrophils. She does not have many symptoms other than her flank pain and shortness of breath. X-ray is negative for pneumonia and she lack respiratory symptoms. UA does not support a kidney infection. KUB was only remarkable for moderate constipation which could be contributing to elevated WBC count. No obvious source of infection has been identified. I'd like the patient to use miralax for the next 48 hours as well as albuterol as needed for shortness of breath. Patient has a follow up with her PCP tomorrow. Other diagnostics and imaging may be considered at that time.     Patient educated on red flag symptoms and asked to go directly to the ED if these symptoms present themselves.       oBb Rice PA-C  [facility]    Subjective   44 year old who presents to clinic today for the following health issues:    Back Pain and Shortness of Breath       HPI     Acute Illness  Acute illness concerns: Upper/mid pain since today, no injury. No overuse injury. Pain has been worsening.  Since 1 month ago, wheezing with exertion. Patient has a history of chilhood asthma.   Symptoms:  Fever: No  Chills/Sweats: No  Headache (location?): No  Sinus Pressure:  No  Conjunctivitis:  No  Ear Pain: no  Rhinorrhea: No  Congestion: No  Sore Throat: No  Cough: no  Wheeze: YES- And some crackling noises and shortness of breath   Decreased Appetite: No  Nausea: No  Vomiting: No  Diarrhea: No  Dysuria/Freq.: No  Dysuria or Hematuria: No  Fatigue/Achiness: No  Sick/Strep Exposure: No  Therapies tried and outcome: Patient has not used an inhaler in years. She is uncertain if she needs to start using this again.     Patient has been taking tylenol and a heat pad for the back pain. Pain is described as a sharp shooting pain down the right side of the back starting a the ribs. Patient states that she was sick in January. Patient denies any recent urinary issues. Patient states that the pain is worse with deep breathing but it is not particularly tender to touch and is not worse with any particular torso movements. Patient denies any chest pain or heart palpitations.     Review of Systems   Review of Systems   See HPI    Objective    Temp: 98.5  F (36.9  C) Temp src: Oral BP: 138/86 Pulse: 84   Resp: 16 SpO2: 98 %       Physical Exam   Physical Exam  Constitutional:       General: She is not in acute distress.     Appearance: Normal appearance. She is normal weight. She is not ill-appearing, toxic-appearing or diaphoretic.   HENT:      Head: Normocephalic and atraumatic.   Cardiovascular:      Rate and Rhythm: Normal rate.      Pulses: Normal pulses.      Heart sounds: Normal heart sounds. No murmur heard.     No friction rub. No gallop.   Pulmonary:      Effort: Pulmonary effort is normal. No respiratory distress.      Breath sounds: Normal breath sounds. No stridor. No wheezing, rhonchi or rales.   Chest:      Chest wall: No tenderness.   Musculoskeletal:        Arms:       Comments: Patient's pain is in the area shown above but this area is not particularly tender to touch, there is no bruising, there is no swelling, there is no erythema, there is no deformity.  Patient seems to have  full range of motion of her torso.  Only deep breathing seems to reproduce the pain.   Neurological:      General: No focal deficit present.      Mental Status: She is alert and oriented to person, place, and time. Mental status is at baseline.      Gait: Gait normal.   Psychiatric:         Mood and Affect: Mood normal.         Behavior: Behavior normal.         Thought Content: Thought content normal.         Judgment: Judgment normal.          EKG - Reviewed and interpreted by me appears normal, NSR, normal axis, normal intervals, no acute ST/T changes c/w ischemia, no LVH by voltage criteria  Results for orders placed or performed in visit on 04/07/25 (from the past 24 hours)   UA Macroscopic with reflex to Microscopic and Culture - Clinic Collect    Specimen: Urine, Midstream   Result Value Ref Range    Color Urine Yellow Colorless, Straw, Light Yellow, Yellow    Appearance Urine Clear Clear    Glucose Urine Negative Negative mg/dL    Bilirubin Urine Negative Negative    Ketones Urine Negative Negative mg/dL    Specific Gravity Urine 1.010 1.003 - 1.035    Blood Urine Trace (A) Negative    pH Urine 5.5 5.0 - 7.0    Protein Albumin Urine Negative Negative mg/dL    Urobilinogen Urine 0.2 0.2, 1.0 E.U./dL    Nitrite Urine Negative Negative    Leukocyte Esterase Urine Negative Negative   UA Microscopic with Reflex to Culture   Result Value Ref Range    RBC Urine 0-2 0-2 /HPF /HPF    WBC Urine 0-5 0-5 /HPF /HPF    Narrative    Urine Culture not indicated   WBC count   Result Value Ref Range    WBC Count 15.2 (H) 4.0 - 11.0 10e3/uL   CBC with platelets and differential    Narrative    The following orders were created for panel order CBC with platelets and differential.  Procedure                               Abnormality         Status                     ---------                               -----------         ------                     CBC with platelets and ...[[id number]]  Abnormal            Final result                  Please view results for these tests on the individual orders.   CBC with platelets and differential   Result Value Ref Range    WBC Count 15.2 (H) 4.0 - 11.0 10e3/uL    RBC Count 4.37 3.80 - 5.20 10e6/uL    Hemoglobin 13.6 11.7 - 15.7 g/dL    Hematocrit 42.2 35.0 - 47.0 %    MCV 97 78 - 100 fL    MCH 31.1 26.5 - 33.0 pg    MCHC 32.2 31.5 - 36.5 g/dL    RDW 12.9 10.0 - 15.0 %    Platelet Count 307 150 - 450 10e3/uL    % Neutrophils 75 %    % Lymphocytes 15 %    % Monocytes 8 %    % Eosinophils 2 %    % Basophils 0 %    % Immature Granulocytes 0 %    Absolute Neutrophils 11.3 (H) 1.6 - 8.3 10e3/uL    Absolute Lymphocytes 2.3 0.8 - 5.3 10e3/uL    Absolute Monocytes 1.2 0.0 - 1.3 10e3/uL    Absolute Eosinophils 0.3 0.0 - 0.7 10e3/uL    Absolute Basophils 0.0 0.0 - 0.2 10e3/uL    Absolute Immature Granulocytes 0.0 <=0.4 10e3/uL   XR KUB    Narrative    EXAM: XR KUB  LOCATION: [facility]  DATE: 4/7/2025    INDICATION:  Right flank pain  COMPARISON: None.      Impression    IMPRESSION: IUD in midline pelvis. Nonobstructive bowel gas pattern with no gross organomegaly nor urinary tract calculus. Moderate stool burden.   XR Chest 2 Views    Narrative    EXAM: XR CHEST 2 VIEWS  LOCATION: [facility]  DATE: 4/7/2025    INDICATION:  Right flank pain  COMPARISON: None.      Impression    IMPRESSION: Negative chest.

## 2025-04-07 NOTE — CONFIDENTIAL NOTE
Urgent Care Clinic Visit    Chief Complaint   Patient presents with    Back Pain     Upper/mid pain since today, no injury     Shortness of Breath     Since 1 month ago, wheezing also, does have Asthma FYI                4/7/2025     4:36 PM   Additional Questions   Roomed by JORDI Hernandez

## 2025-04-08 ENCOUNTER — VIRTUAL VISIT (OUTPATIENT)
Dept: FAMILY MEDICINE | Facility: CLINIC | Age: 45
End: 2025-04-08
Payer: COMMERCIAL

## 2025-04-08 DIAGNOSIS — R10.9 ACUTE RIGHT FLANK PAIN: Primary | ICD-10-CM

## 2025-04-08 DIAGNOSIS — R06.02 SHORTNESS OF BREATH: ICD-10-CM

## 2025-04-08 PROCEDURE — 98006 SYNCH AUDIO-VIDEO EST MOD 30: CPT | Performed by: FAMILY MEDICINE

## 2025-04-08 NOTE — PATIENT INSTRUCTIONS
Please call [facility] at [phone] to schedule your abdominal ultrasound.    Locations:   [facility], [facility] [id number]

## 2025-04-08 NOTE — PROGRESS NOTES
Kinsey is a 44 year old who is being evaluated via a billable video visit.        Assessment & Plan     (R10.9) Acute right flank pain  (primary encounter diagnosis)  New, previously undiagnosed problem with uncertain prognosis and additional work-up planned.   Constipation vs appendicitis vs other intestinal obstruction vs mass.  Nephrolithiasis not likely given normal UA and KUB.  Continue miralax, will continue usual activity.  Obtain US, repeat labs as below, Will fu as indicated.   Plan: US Abdomen Complete, CBC with platelets and         differential, Comprehensive metabolic panel         (BMP + Alb, Alk Phos, ALT, AST, Total. Bili,         TP)          (R06.02) Shortness of breath  Comment: possibly due to constipation, no risk factors for PE, will check some lags as below.  Plan: TSH with free T4 reflex, D dimer, quantitative        Will fu as indicated.     Subjective   Kinsey is a 44 year old, presenting for the following health issues:  No chief complaint on file.        4/7/2025     4:36 PM   Additional Questions   Roomed by JORDI Hernandez     History of Present Illness       Reason for visit:  New symptoms of wheezing, difficulty breathing, pain in back when breathing, constipation  Symptom onset:  More than a month  Symptoms include:  New symptoms of wheezing, difficulty breathing, pain in back when breathing, constipation  Symptom intensity:  Moderate  Symptom progression:  Staying the same  Had these symptoms before:  No  What makes it worse:  Running- aerobic activity makes breathing more challenging  What makes it better:  Mirilax and increased fluids have relieved some constipation, but not all   She is taking medications regularly.        Right flank pain  Kinsey reports severe posterior right flank pain, sudden onset yesterday, worse with deep breaths. No pain on palpation. She was seen in UC, had normal CXR and KUB, only abnormality was constipation. Labs significant for elevated WBC.    Pain is  "improved but continues to have \"tightness\" of her right back. Heat pack seemed to help the most.    She's also felt very fatigued, \"full\", no appetite, but no nausea. She's had very hard bowel movements, is taking miralax which is helping.      Asthma  Patient has not had an ACT done in this encounter: Link to ACT Flowsheet        No data to display              Do you have any of the following symptoms? Trouble with breathing (shortness of breath), even with mimimal exercise such as walking up stairs. She runs regularly but now has to stop to catch her breath.  Sob started about one month ago.  She's wheezing when running.  She does have fall allergies,, not usually spring allergies.  They're doing a radon test in their house.  She doesn't have any leg swelling, no history of period of time with complete immobilization.      Review of Systems  Constitutional, HEENT, cardiovascular, pulmonary, gi and gu systems are negative, except as otherwise noted.      Objective           Vitals:  No vitals were obtained today due to virtual visit.    Physical Exam   GENERAL: alert and no distress  EYES: Eyes grossly normal to inspection.  No discharge or erythema, or obvious scleral/conjunctival abnormalities.  RESP: No audible wheeze, cough, or visible cyanosis.    SKIN: Visible skin clear. No significant rash, abnormal pigmentation or lesions.  NEURO: Cranial nerves grossly intact.  Mentation and speech appropriate for age.  PSYCH: Appropriate affect, tone, and pace of words        Video-Visit Details    Type of service:  Video Visit   Originating Location (pt. Location): Home    Distant Location (provider location):  Off-site  Platform used for Video Visit: Judi  Signed Electronically by: Leyla Garay MD     "

## 2025-04-09 ENCOUNTER — ANCILLARY PROCEDURE (OUTPATIENT)
Dept: ULTRASOUND IMAGING | Facility: CLINIC | Age: 45
End: 2025-04-09
Attending: FAMILY MEDICINE
Payer: COMMERCIAL

## 2025-04-09 ENCOUNTER — LAB (OUTPATIENT)
Dept: LAB | Facility: CLINIC | Age: 45
End: 2025-04-09
Payer: COMMERCIAL

## 2025-04-09 DIAGNOSIS — R10.9 ACUTE RIGHT FLANK PAIN: ICD-10-CM

## 2025-04-09 DIAGNOSIS — R06.02 SHORTNESS OF BREATH: ICD-10-CM

## 2025-04-09 LAB
ALBUMIN SERPL BCG-MCNC: 4.3 G/DL (ref 3.5–5.2)
ALP SERPL-CCNC: 70 U/L (ref 40–150)
ALT SERPL W P-5'-P-CCNC: 13 U/L (ref 0–50)
ANION GAP SERPL CALCULATED.3IONS-SCNC: 10 MMOL/L (ref 7–15)
AST SERPL W P-5'-P-CCNC: 21 U/L (ref 0–45)
BASOPHILS # BLD AUTO: 0 10E3/UL (ref 0–0.2)
BASOPHILS NFR BLD AUTO: 0 %
BILIRUB SERPL-MCNC: 0.2 MG/DL
BUN SERPL-MCNC: 9.6 MG/DL (ref 6–20)
CALCIUM SERPL-MCNC: 9.5 MG/DL (ref 8.8–10.4)
CHLORIDE SERPL-SCNC: 102 MMOL/L (ref 98–107)
CREAT SERPL-MCNC: 0.64 MG/DL (ref 0.51–0.95)
D DIMER PPP FEU-MCNC: <0.27 UG/ML FEU (ref 0–0.5)
EGFRCR SERPLBLD CKD-EPI 2021: >90 ML/MIN/1.73M2
EOSINOPHIL # BLD AUTO: 0.2 10E3/UL (ref 0–0.7)
EOSINOPHIL NFR BLD AUTO: 2 %
ERYTHROCYTE [DISTWIDTH] IN BLOOD BY AUTOMATED COUNT: 13.1 % (ref 10–15)
GLUCOSE SERPL-MCNC: 108 MG/DL (ref 70–99)
HCO3 SERPL-SCNC: 27 MMOL/L (ref 22–29)
HCT VFR BLD AUTO: 39.8 % (ref 35–47)
HGB BLD-MCNC: 13.1 G/DL (ref 11.7–15.7)
IMM GRANULOCYTES # BLD: 0 10E3/UL
IMM GRANULOCYTES NFR BLD: 0 %
LYMPHOCYTES # BLD AUTO: 2 10E3/UL (ref 0.8–5.3)
LYMPHOCYTES NFR BLD AUTO: 26 %
MCH RBC QN AUTO: 31.8 PG (ref 26.5–33)
MCHC RBC AUTO-ENTMCNC: 32.9 G/DL (ref 31.5–36.5)
MCV RBC AUTO: 97 FL (ref 78–100)
MONOCYTES # BLD AUTO: 0.6 10E3/UL (ref 0–1.3)
MONOCYTES NFR BLD AUTO: 8 %
NEUTROPHILS # BLD AUTO: 4.8 10E3/UL (ref 1.6–8.3)
NEUTROPHILS NFR BLD AUTO: 63 %
NRBC # BLD AUTO: 0 10E3/UL
NRBC BLD AUTO-RTO: 0 /100
PLATELET # BLD AUTO: 275 10E3/UL (ref 150–450)
POTASSIUM SERPL-SCNC: 4.1 MMOL/L (ref 3.4–5.3)
PROT SERPL-MCNC: 7.4 G/DL (ref 6.4–8.3)
RBC # BLD AUTO: 4.12 10E6/UL (ref 3.8–5.2)
SODIUM SERPL-SCNC: 139 MMOL/L (ref 135–145)
TSH SERPL DL<=0.005 MIU/L-ACNC: 1.66 UIU/ML (ref 0.3–4.2)
WBC # BLD AUTO: 7.6 10E3/UL (ref 4–11)

## 2025-04-09 PROCEDURE — 85379 FIBRIN DEGRADATION QUANT: CPT | Performed by: FAMILY MEDICINE

## 2025-04-09 PROCEDURE — 99000 SPECIMEN HANDLING OFFICE-LAB: CPT | Performed by: PATHOLOGY

## 2025-04-09 PROCEDURE — 80053 COMPREHEN METABOLIC PANEL: CPT | Performed by: PATHOLOGY

## 2025-04-09 PROCEDURE — 83036 HEMOGLOBIN GLYCOSYLATED A1C: CPT | Performed by: FAMILY MEDICINE

## 2025-04-09 PROCEDURE — 85025 COMPLETE CBC W/AUTO DIFF WBC: CPT | Performed by: PATHOLOGY

## 2025-04-09 PROCEDURE — 76700 US EXAM ABDOM COMPLETE: CPT | Performed by: RADIOLOGY

## 2025-04-09 PROCEDURE — 36415 COLL VENOUS BLD VENIPUNCTURE: CPT | Performed by: PATHOLOGY

## 2025-04-09 PROCEDURE — 84443 ASSAY THYROID STIM HORMONE: CPT | Performed by: PATHOLOGY

## 2025-04-10 NOTE — RESULT ENCOUNTER NOTE
Hurray, all of your labs are normal!  The d-dimer is NOT elevated, so you do not need any additional work up for a PE. Your white count is now normal, it may have been elevated due to stress and pain. Your thyroid and metabolic panel are normal as well.    Sincerely,  Dr. Leyla Garay MD  4/10/2025

## 2025-04-10 NOTE — RESULT ENCOUNTER NOTE
Hi, great news, you do NOT have appendicitis. You do have an ovarian cyst which is a normal finding, and also could be causing your symptoms including pain and constipation. Cysts resolve over time.  You do not need any additional testing at this time, but please let me know if your symptoms DON'T go away completely or new concerns develop.    Sincerely,  Dr. Leyla Garay MD  4/10/2025

## 2025-05-09 ENCOUNTER — MYC REFILL (OUTPATIENT)
Dept: FAMILY MEDICINE | Facility: CLINIC | Age: 45
End: 2025-05-09
Payer: COMMERCIAL

## 2025-05-09 DIAGNOSIS — R06.02 SHORTNESS OF BREATH: Primary | ICD-10-CM

## 2025-05-09 DIAGNOSIS — R03.0 ELEVATED BLOOD PRESSURE READING WITHOUT DIAGNOSIS OF HYPERTENSION: ICD-10-CM

## 2025-05-09 DIAGNOSIS — G43.009 MIGRAINE WITHOUT AURA AND WITHOUT STATUS MIGRAINOSUS, NOT INTRACTABLE: ICD-10-CM

## 2025-05-09 RX ORDER — NARATRIPTAN 2.5 MG/1
2.5 TABLET ORAL
Qty: 9 TABLET | Refills: 1 | Status: SHIPPED | OUTPATIENT
Start: 2025-05-09

## 2025-05-09 NOTE — TELEPHONE ENCOUNTER
"[employer] message sent to patient.  Bev JOHNSON BSN, PHN, RN-[facility]  [phone]

## 2025-05-14 NOTE — TELEPHONE ENCOUNTER
Dr. Garay-Please review and sign if agree.    Thank you!  IBRAHIMA AdairN, RN-BC  [facility]

## 2025-05-15 RX ORDER — AMITRIPTYLINE HYDROCHLORIDE 50 MG/1
50 TABLET ORAL AT BEDTIME
Qty: 90 TABLET | Refills: 1 | Status: SHIPPED | OUTPATIENT
Start: 2025-05-15

## 2025-05-15 RX ORDER — ALBUTEROL SULFATE 90 UG/1
INHALANT RESPIRATORY (INHALATION)
Qty: 18 G | Refills: 2 | Status: SHIPPED | OUTPATIENT
Start: 2025-05-15

## 2025-07-01 ENCOUNTER — PATIENT OUTREACH (OUTPATIENT)
Dept: CARE COORDINATION | Facility: CLINIC | Age: 45
End: 2025-07-01
Payer: COMMERCIAL

## 2025-07-18 ENCOUNTER — TRANSFERRED RECORDS (OUTPATIENT)
Dept: MULTI SPECIALTY CLINIC | Facility: CLINIC | Age: 45
End: 2025-07-18
Payer: COMMERCIAL

## (undated) DEVICE — GLOVE PROTEXIS BLUE W/NEU-THERA 7.0  2D73EB70

## (undated) DEVICE — SPECIMEN TRAP VACUUM SUCTION SAFETOUCH 003853-902

## (undated) DEVICE — LINEN TOWEL PACK X5 5464

## (undated) DEVICE — PREP SCRUB SOL EXIDINE 4% CHG 4OZ 29002-404

## (undated) DEVICE — SOL NACL 0.9% IRRIG 1000ML BOTTLE 2F7124

## (undated) DEVICE — DECANTER TRANSFER DEVICE 2008S

## (undated) DEVICE — Device

## (undated) DEVICE — TUBING SUCTION VACUUM COLLECTION 6FT 610

## (undated) DEVICE — SOL WATER IRRIG 1000ML BOTTLE 2F7114

## (undated) DEVICE — PAD CHUX UNDERPAD 30X36" P3036C

## (undated) DEVICE — LINEN GOWN X4 5410

## (undated) DEVICE — GLOVE PROTEXIS W/NEU-THERA 6.5  2D73TE65

## (undated) DEVICE — SUCTION VACUUM CANISTER STANDARD W/LID&CAPS 003987-901

## (undated) DEVICE — STRAP KNEE/BODY 31143004

## (undated) RX ORDER — DEXAMETHASONE SODIUM PHOSPHATE 4 MG/ML
INJECTION, SOLUTION INTRA-ARTICULAR; INTRALESIONAL; INTRAMUSCULAR; INTRAVENOUS; SOFT TISSUE
Status: DISPENSED
Start: 2022-02-04

## (undated) RX ORDER — KETOROLAC TROMETHAMINE 30 MG/ML
INJECTION, SOLUTION INTRAMUSCULAR; INTRAVENOUS
Status: DISPENSED
Start: 2022-02-04

## (undated) RX ORDER — FENTANYL CITRATE 50 UG/ML
INJECTION, SOLUTION INTRAMUSCULAR; INTRAVENOUS
Status: DISPENSED
Start: 2022-02-04

## (undated) RX ORDER — ONDANSETRON 2 MG/ML
INJECTION INTRAMUSCULAR; INTRAVENOUS
Status: DISPENSED
Start: 2022-02-04

## (undated) RX ORDER — DOXYCYCLINE 100 MG/1
CAPSULE ORAL
Status: DISPENSED
Start: 2022-02-04

## (undated) RX ORDER — ACETAMINOPHEN 325 MG/1
TABLET ORAL
Status: DISPENSED
Start: 2022-02-04

## (undated) RX ORDER — LIDOCAINE HYDROCHLORIDE 10 MG/ML
INJECTION, SOLUTION EPIDURAL; INFILTRATION; INTRACAUDAL; PERINEURAL
Status: DISPENSED
Start: 2022-02-04